# Patient Record
Sex: FEMALE | Race: BLACK OR AFRICAN AMERICAN | Employment: UNEMPLOYED | URBAN - METROPOLITAN AREA
[De-identification: names, ages, dates, MRNs, and addresses within clinical notes are randomized per-mention and may not be internally consistent; named-entity substitution may affect disease eponyms.]

---

## 2020-01-06 ENCOUNTER — OFFICE VISIT (OUTPATIENT)
Dept: SURGERY | Age: 55
End: 2020-01-06

## 2020-01-07 VITALS — HEIGHT: 64 IN | BODY MASS INDEX: 50.02 KG/M2 | WEIGHT: 293 LBS

## 2020-01-07 NOTE — PROGRESS NOTES
Ignacio Monsivais participated in an educational session on the importance of starting to make healthy choices prior to weight loss surgery. General healthy foods were reviewed. Diet history was reviewed. Patient set a dietary, exercise, and behavioral goal in order to start making healthy changes now.      Visit Vitals  Ht 5' 4\" (1.626 m)   Wt 153.5 kg (338 lb 8 oz)   BMI 58.10 kg/m²        01/07/20    Susie Schneider

## 2020-02-03 ENCOUNTER — OFFICE VISIT (OUTPATIENT)
Dept: SURGERY | Age: 55
End: 2020-02-03

## 2020-02-03 VITALS
RESPIRATION RATE: 16 BRPM | BODY MASS INDEX: 50.02 KG/M2 | HEART RATE: 60 BPM | DIASTOLIC BLOOD PRESSURE: 68 MMHG | SYSTOLIC BLOOD PRESSURE: 148 MMHG | WEIGHT: 293 LBS | TEMPERATURE: 97.8 F | OXYGEN SATURATION: 100 % | HEIGHT: 64 IN

## 2020-02-03 DIAGNOSIS — E66.01 MORBID OBESITY (HCC): ICD-10-CM

## 2020-02-03 DIAGNOSIS — E66.01 MORBID OBESITY WITH BODY MASS INDEX (BMI) OF 50.0 TO 59.9 IN ADULT (HCC): Primary | ICD-10-CM

## 2020-02-03 DIAGNOSIS — M25.50 ARTHRALGIA, UNSPECIFIED JOINT: ICD-10-CM

## 2020-02-03 DIAGNOSIS — F17.200 SMOKER: ICD-10-CM

## 2020-02-03 DIAGNOSIS — F19.11 HISTORY OF DRUG ABUSE (HCC): ICD-10-CM

## 2020-02-03 DIAGNOSIS — N92.6 IRREGULAR MENSES: ICD-10-CM

## 2020-02-03 DIAGNOSIS — K30 FUNCTIONAL DYSPEPSIA: ICD-10-CM

## 2020-02-03 DIAGNOSIS — I10 HYPERTENSION, UNSPECIFIED TYPE: ICD-10-CM

## 2020-02-03 RX ORDER — PENICILLIN V POTASSIUM 500 MG/1
500 TABLET, FILM COATED ORAL
COMMUNITY
Start: 2020-02-01 | End: 2020-02-11

## 2020-02-03 NOTE — PROGRESS NOTES
Bariatric Surgery Consultation    Subjective: The patient is a 49 y.o. obese female with a Body mass index is 55.66 kg/m². .  The patient is at her heaviest weight for the past 5 years. she has been overweight since age 37.     she has been considering surgery since last 2 years. she desires surgery at this time because of multiple health concerns and their lifestyle issues which are hindered by their weight. she has been referred by his family physician Dr Rafi Albarado for evaluation and treatment of their obesity via surgical intervention. Cabrera Bell has tried multiple diets in her lifetime   most recently tried physician supervised, behavior modification, Weight Watchers and Atkins    Bariatric comorbidities present are   Patient Active Problem List   Diagnosis Code    Morbid obesity ([facility] E66.01    Morbid obesity with body mass index (BMI) of 50.0 to 59.9 in adult ([facility] [facility] E66.01, Z68.43    Smoker F17.200    History of drug abuse ([facility] F19.11    Hypertension I10    Arthritic-like pain M25.50    Irregular menses N92.6    Functional dyspepsia K30       The patient is considering laparoscopic gastric bypass surgery for surgical weight loss due to their ineffective progress with medical forms of weight loss and the urging of their physicians   who care for their primary medical issues. The patient  now presents  for consideration for weight loss surgery understanding the benefits of this over a medical approach of weight loss as   was discussed in our presentation on weight loss surgery. They have discussed their plans both with their family and primary care physician who is in support of their pursuit of such. The patient   has not had health issues as of late and denies and gastrointestinal disturbances other than what is outlined below in their review of symptoms.  All of their prior evaluations available by both their   PCP's and specialists physicians have been reviewed today either in the Care Everywhere portal or scanned under the media tab. I have spent a large portion of my initial consultation today reviewing the patients current dietary habits which have contributed to their health issues and obesity. I have suggested to them personally a dietary regimen that they can initiate now to help with their status as it pertains to their weight. They understand that the most important aspect of their   journey through their weight loss endeavor will be their adherence to a new lifestyle of healthy eating behavior. They also understand that an adherence to an exercise program will not only   help with weight loss but is ultimately important in weight maintenance. The patients goal weight is 180lb. These goals are consistent with expected outcomes of their desired operation. her Medical goals are resolution of these health issues. Patient Active Problem List    Diagnosis Date Noted    Morbid obesity ([facility]     Morbid obesity with body mass index (BMI) of 50.0 to 59.9 in [facility])     Smoker     History of drug abuse ([facility] [address] [facility]     Hypertension     Arthritic-like pain     Irregular menses     Functional dyspepsia       Past Surgical History:   Procedure Laterality Date    HX HERNIA REPAIR      umbilical     HX OTHER SURGICAL      repair of vaginal trauma related to childbirth      Social History     Tobacco Use    Smoking status: Former Smoker     Last attempt to quit: 2/3/2020    Smokeless tobacco: Never Used   Substance Use Topics    Alcohol use: Not Currently      Family History   Problem Relation Age of Onset    Heart Disease Mother     Diabetes Mother     Liver Disease Father       Current Outpatient Medications   Medication Sig Dispense Refill    penicillin v potassium (VEETID) 500 mg tablet Take 500 mg by mouth.        No Known Allergies       Review of Systems:            General - No history or complaints of unexpected fever, chills, or weight loss  Head/Neck - No history or complaints of headache, diplopia, dysphagia, hearing loss  Cardiac - No history or complaints of chest pain, palpitations, murmur, or shortness of breath  Pulmonary - No history or complaints of shortness of breath, productive cough, hemoptysis  Gastrointestinal - mild reflux noted ,no  abdominal pain, obstipation/constipation or blood per rectum  Genitourinary - No history or complaints of hematuria/dysuria, stress urinary incontinence symptoms, or renal lithiasis  Musculoskeletal - + joint pain in their knees,  no muscular weakness  Hematologic - No history or complaints of bleeding disorders,  No blood transfusions  Neurologic - No history or complaints of  migraine headaches, seizure activity, syncopal episodes, TIA or stroke  Integumentary - No history or complaints of rashes, abnormal nevi, skin cancer  Gynecological - sporadic spotting           Objective:     Visit Vitals  /68 (BP 1 Location: Left arm, BP Patient Position: Sitting)   Pulse 60   Temp 97.8 °F (36.6 °C)   Resp 16   Ht 5' 4\" (1.626 m)   Wt 147.1 kg (324 lb 4 oz)   SpO2 100%   BMI 55.66 kg/m²       Physical Examination: General appearance - alert, well appearing, and in no distress and oriented to person, place, and time  Mental status - alert, oriented to person, place, and time, normal mood, behavior, speech, dress, motor activity, and thought processes  Eyes - pupils equal and reactive, extraocular eye movements intact, sclera anicteric, left eye normal, right eye normal  Ears - bilateral TM's and external ear canals normal, right ear normal, left ear normal  Nose - normal and patent, no erythema, discharge or polyps  Mouth - mucous membranes moist, pharynx normal without lesions  Neck - supple, no significant adenopathy  Lymphatics - no palpable lymphadenopathy, no hepatosplenomegaly  Chest - clear to auscultation, no wheezes, rales or rhonchi, symmetric air entry  Heart - normal rate, regular rhythm, normal S1, S2, no murmurs, rubs, clicks or gallops  Abdomen - soft, nontender, nondistended, no masses or organomegaly  Back exam - full range of motion, no tenderness, palpable spasm or pain on motion  Neurological - alert, oriented, normal speech, no focal findings or movement disorder noted  Musculoskeletal - no joint tenderness, deformity or swelling  Extremities - peripheral pulses normal, no pedal edema, no clubbing or cyanosis  Skin - normal coloration and turgor, no rashes, no suspicious skin lesions noted    Labs:     No results found for: WBC, WBCLT, HGBPOC, HGB, HGBP, HCTPOC, HCT, PHCT, RBCH, PLT, MCV, HGBEXT, HCTEXT, PLTEXT  No results found for: NA, K, CL, CO2, AGAP, GLU, BUN, CREA, BUCR, GFRAA, GFRNA, CA, TBIL, TBILI, GPT, SGOT, AP, TP, ALB, GLOB, AGRAT, ALT  No results found for: IRON, FE, TIBC, IBCT, PSAT, FERR  No results found for: FOL, RBCF  No results found for: VITD3, XQVID2, XQVID3, XQVID, VD3RIA              Assessment:     Morbid obesity with associated comorbidity    Plan:     laparoscopic gastric bypass surgery    This is a 49 y.o. female with a BMI of Body mass index is 55.66 kg/m². and the weight-related co-morbidties . Adia Morillo meets the NIH criteria for bariatric surgery based upon the BMI of Body mass index is 55.66 kg/m². and   multiple weight-related co-morbidties. Adia Morillo has elected laparoscopic gastric bypass as her intervention of choice for treatment of morbid obestiy through surgical means secondary   to its uniform results,  profound baseline suppression of hunger and pace at which weight is lost.    In the office today, following Socorro's history and physical examination, a 30 minute discussion regarding the anatomic alterations for the laparoscopic gastric bypass  was undertaken. The dietary expectations and the patient  dependent factors for success were thoroughly discussed, to include the need for interval follow-up and long-term dietary changes associated with success.    The possible short and long term complications of the gastric bypass were also discussed, to include but not limited to;death, DVT/PE, staple line leak, bleeding, stricture formation, infection,internal   hernia  and pouch dilation. Specific weight related outcomes for success were also discussed with an emphasis on careful and close follow-up with the first year and dietary behavior modification over   the first years as baseline cyclical hunger returns  The patient expressed an understanding of the above factors, and her questions were answered in their entirety. In addition, the patient attended a 1.5 hour power point seminar, or an online seminar regarding obesity, surgical weight loss including, adjustable gastric band, gastric bypass, and sleeve gastrectomy. This discussion   contrasted the different surgical techniques, mechanisms of actions and expected outcomes, and surgical and medical risks associated with each procedure. During the  in office seminar, there was a long   question and answer session where each questions was answered until there were no additional questions. Today, the patient had all of her questions answered and desires to proceed with  bariatric surgery initially choosing the gastric bypass as her surgical option. Secondary Diagnoses:     Dietary Intervention  - The patient is currently scheduled to see or has been followed by a bariatric nutritionist for an attempt at preoperative weight loss as has been dictated by their insurance carrier. They will be assessed at various times during their follow up to evaluate their progress depending on the length of time that is required once again by their carrier. I have explained the importance of   preoperative weight loss and the benefits regarding lower surgical risk and also assisting the patient in reaching their weight loss goal. They understand also that they should participate in an  exercise program to assist in this weight loss.  Finally they understand there is a physiologic benefit from the standpoint of hepatic volume reduction and reduction of central visceral adiposity   preoperatively. I have reiterated the importance of a low carbohydrate and high protein regimen to achieve their   stated goal. I have reviewed their current eating behavior prior to this encounter and explained to them in an exhaustive fashion the appropriate diet that they should adhere to. They have been   encouraged to loose weight pre operatively and understand it is our prerogative to cancel surgery or postpone their procedure in the event of significant weight gain. The patients weight loss goal pre operatively is 5-10 pounds. Hypertension - The patient has a clear understanding of how weight loss improves hypertension as a whole, but also they understand that there is a significant genetic component   to this disease process. We will monitor the patients blood pressure while in the hospital and the plan would be to continue those medications postoperatively. If a diuretic is being   used we will stop them on discharge to prevent dehydration particularly with the sleeve gastrectomy and the gastric bypass procedures. They will be instructed to monitor their blood   pressure postoperatively while at home and notify their primary care physician in the event of any significantly high or uncharacteristic readings. They understand that surgery may be  cancelled if their blood pressure is deemed out of control the day of surgery either by myself or the anesthesia department. For this reason they must take their medications as directed   and monitor their blood pressure regularly working up until their surgical date.     Weight Related Arthritis -The patient understands the benefits that weight loss surgery can have on their arthritis but also understands that weight loss is not a guaranteed cure and relief of symptoms is often dependent on the severity of the underlying disease.  The patient also understands that traditional pharmaceutical treatments for this diagnosis are usually unavailable to post-operative weight loss patients due to the effects on the gastrointestinal tract particularly with the gastric bypass and to a lesser effect with the sleeve gastrectomy.  Any changes to the patients medication treatment will ultimately be made the patients PCP with input by our office. Possible or Existing Coronary Artery Disease - The patient has undergone or will undergo a preoperative evaluation by their cardiologist, or a cardiologist of their choice such that they are deemed   a reasonable candidate for surgery. The patient understands that with a history of cardiac disease that there is always an increased risk compared   to the average patient. Appropriate recommendations have been followed as recommended by the cardiologist.  The patients ASA will be resumed   approximately 1 month postoperatively in a coated form if appropriate. Will send to [facility]. for cardiac evaluation.     Signed By: Elsie Gordon MD     February 3, 2020

## 2020-02-12 ENCOUNTER — HOSPITAL ENCOUNTER (OUTPATIENT)
Dept: PREADMISSION TESTING | Age: 55
Discharge: HOME | End: 2020-02-12
Payer: COMMERCIAL

## 2020-02-12 ENCOUNTER — CLINICAL SUPPORT (OUTPATIENT)
Dept: SURGERY | Age: 55
End: 2020-02-12

## 2020-02-12 VITALS — HEIGHT: 64 IN | BODY MASS INDEX: 50.02 KG/M2 | WEIGHT: 293 LBS

## 2020-02-12 DIAGNOSIS — E66.01 MORBID OBESITY WITH BODY MASS INDEX (BMI) OF 50.0 TO 59.9 IN ADULT (HCC): Primary | ICD-10-CM

## 2020-02-12 DIAGNOSIS — E66.01 MORBID OBESITY WITH BODY MASS INDEX (BMI) OF 50.0 TO 59.9 IN ADULT (HCC): ICD-10-CM

## 2020-02-12 LAB — TSH SERPL DL<=0.05 MIU/L-ACNC: 0.87 UIU/ML (ref 0.36–3.74)

## 2020-02-12 PROCEDURE — 86677 HELICOBACTER PYLORI ANTIBODY: CPT

## 2020-02-12 PROCEDURE — 36415 COLL VENOUS BLD VENIPUNCTURE: CPT

## 2020-02-12 PROCEDURE — 84443 ASSAY THYROID STIM HORMONE: CPT

## 2020-02-12 NOTE — PROGRESS NOTES
Medical Weight Loss Multi-Disciplinary Program    Name: Kate Harding   : 1965    Session# 2  Date: 2020     Height: 5' 4\" (162.6 cm)    Weight: 148.8 kg (328 lb) lbs. Body mass index is 56.3 kg/m². Dietitian: Tonia Cohen is a 49 y.o. female who present for a pre-op evaluation. Visit Vitals  Ht 5' 4\" (1.626 m)   Wt 148.8 kg (328 lb)   BMI 56.30 kg/m²     Past Medical History:   Diagnosis Date    Arthritic-like pain     Functional dyspepsia     History of drug abuse ([facility] [address]     sober since     Hypertension     uses prescription medication    Irregular menses     Morbid obesity ([facility] [address]     Morbid obesity with body mass index (BMI) of 50.0 to 59.9 in adult [facility])     Smoker     quitting early  with nicotine patch           Procedure:  laparoscopic gastric bypass surgery     Reasons for Surgery:  BMI > 40 with one or more medically significant comorbidities and HTN    Summary:  Pt given brief pre/post-op diet ed and diet hx reviewed. Pt instructed to follow a low calorie, low carbohydrate, high protein diet of about 0676-9675 calories daily. Pt set several goals. See below. What have you done in the past to try to lose weight? Calorie controlled diet, exercise programs    Why didn't you lose weight or keep the weight off?: Patient notes that she has tried to manage her weight with traditional diet and exercise in the past, she notes without support or guidance she has had limited success with previous weight loss attempts     Why do you think having weight loss surgery will make it possible for you to lose weight and keep it off? Patient hopes surgery will help her control her weight and improve total health     Dietary Instructions    Nutritional Hx: What is the number of meals you eat per day? 2  Comment: SKIPS breakfast and lunch     Do you eat between meals / snack? yes  Typical snack: chips, candy bar, fruit snacks    How fast do you eat your meals?  slow    How often do you eat fast food? occasionally    How many sodas/sugared beverages do you drink per day? Pepsi and juice all day     How many caffeinated drinks do you have per day? Soda and coffee    How much milk and/or juice do you drink per day? Juice daily    How much water do you drink per day? 1 (8oz glasses)    How often do you consume alcohol? never;       Reviewed intake  Understanding label reading  Understanding low carbohydrates, low sugar, higher protein meals  Understanding proper portions  Dining outside home  Instruction given for personal dietary changes  Discussed perceived compliance  Comments: Pt given brief pre/post-op diet ed and diet hx reviewed. Patient Education and Materials Provided:  Supplement [facility], B Vitamin Information, MVI Recommendations, Calcium Citrate Information, Bariatric Supplement Companies, Protein Supplement Information, Fluid Requirements, No Caffeine or Carbonation, No Alcohol for One Year Post Op, 3 Balanced Meals a Day, Food Group Guide, Good Choices Dining Out, No Snacks, No Concentrated Sweets, Support System at Home, Exercising, Support Group Information and Addressed Current Habits / Changes to make  Physical Activity/Exercise    Discussed Perceived Compliance  Reasonable Goals Set  Motivation  Comments: none    Exercise:  Do you currently have an exercise routine? no    Goals:   1. Work to increase to 3-4 small meals per day, with planned snacks as needed. Recommend following plate method for meal planning - focusing on lean protein, non-starchy vegetables, and measured amounts of starch. - Goal of  g protein and 100 g carbohydrate per day. - Recommend continuing protein supplement as meal replacement at least 1x/day  2. Increase non caloric fluid to 64 oz per day.   Eliminate caffeine, added sugar, carbonation, and straws.               -Continue to work to decrease sugar sweetened beverages - goal of calorie free beverages only -Must eliminate caffeine prior to surgery and avoid for ~6-8 weeks  3. Start activity regimen, work to increase ADL              -Try to start walking for at least 30-60  minutes 4-5 days per week  4. Start Complete MVI    Candidate for surgery (per RD): PENDING   Mikayla Tafoya, GANGA  02/12/20

## 2020-02-17 LAB
H PYLORI IGA SER-ACNC: <9 UNITS (ref 0–8.9)
H PYLORI IGG SER IA-ACNC: 0.36 INDEX VALUE (ref 0–0.79)
H PYLORI IGM SER-ACNC: <9 UNITS (ref 0–8.9)

## 2020-03-19 ENCOUNTER — OFFICE VISIT (OUTPATIENT)
Dept: SURGERY | Age: 55
End: 2020-03-19

## 2020-03-19 VITALS — WEIGHT: 293 LBS | HEIGHT: 64 IN | BODY MASS INDEX: 50.02 KG/M2

## 2020-03-19 DIAGNOSIS — E66.01 MORBID OBESITY WITH BODY MASS INDEX (BMI) OF 50.0 TO 59.9 IN ADULT (HCC): Primary | ICD-10-CM

## 2020-03-19 NOTE — PROGRESS NOTES
Medical Weight Loss Multi-Disciplinary Program    Name: Joe Castellon   : 1965    Session# 3  Date: 3/19/2020    Visit Vitals  Ht 5' 4\" (1.626 m)   Wt 148.8 kg (328 lb)   BMI 56.30 kg/m²       Patient is still smoking   Dietary Instructions    Reviewed intake  Understanding low carbohydrates, low sugar, higher protein meals  Instruction given for personal dietary changes  Discussed perceived compliance  Comments: Diet hx reviewed and personal dietary changes discussed. Reviewed recommendation to follow [id number] calorie diet, working to reduce total carbohydrate intake to  g or less per day and increasing protein intake to  g per day, compared current intake to recommendations. Typical intake is as follows:  Breakfast: 1 boiled with 3-4 slices of wilks   Snack: 1 snack size bag of chips OR apple OR pear   Lunch: Grilled cheese and vegetable soup   Dinner: Roast chicken with gravy, potatoes and green beans  Fluids: Water - 6-7 bottles per day     Nutritional Hx: What is the number of meals you eat per day? 2-3 - typically     Do you eat between meals / snack? yes  Typical snack:  1 snack size bag of chips OR apple OR pear     Have you sampled protein supplements? no and reports unable to afford protein supplements     How often do you eat fast food, take-out restaurant food? none    How fast do you eat your meals? slow    How many sodas/sugared beverages do you drink per day? none    How many caffeinated drinks do you have per day? Coffee, but not daily     How much water do you drink per day? 8-10 (8oz glasses)    How often do you consume alcohol? Never     Are you taking complete multivitamin:  None     Physical Activity/Exercise    Discussed Perceived Compliance  Motivation    Patient has  started physical activity regimen, reinforced the importance of regular physical activity for total health and weight management.   Tracking activity with exercise log, created circuit at your home - using neighborhood loop and steps. Behavior Modification    Identify obstacles to trigger change  Achieving/Rewarding goals met  Positive attitude  Comments: Reinforced importance continuing to modify lifestyle patterns and behaviors to promote weight loss and long term weight maintenance       Goals:   1. Work to increase to 3-4 small meals per day, with planned snacks as needed. Recommend following plate method for meal planning - focusing on lean protein, non-starchy vegetables, and measured amounts of starch. - Goal of  g protein and  g carbohydrate per day. - Recommend continuing protein supplement as meal replacement at least 1x/day OR as high protein snack option  2. Increase non caloric fluid to 64 oz per day. Eliminate caffeine, added sugar, carbonation, and straws.               -Continue to work to decrease sugar sweetened beverages - goal of calorie free beverages only              -Must eliminate caffeine prior to surgery and avoid for ~6-8 weeks   -Practice 30:30 rule,  food and flood   3. Start activity regimen, work to increase ADL  4. Start Complete MVI    Candidate for surgery (per RD):  PENDING    Dietitian: Chelsea Elkins RD

## 2020-04-16 ENCOUNTER — OFFICE VISIT (OUTPATIENT)
Dept: SURGERY | Age: 55
End: 2020-04-16

## 2020-04-16 DIAGNOSIS — E66.01 MORBID OBESITY WITH BODY MASS INDEX (BMI) OF 50.0 TO 59.9 IN ADULT (HCC): Primary | ICD-10-CM

## 2020-04-16 NOTE — PROGRESS NOTES
Medical Weight Loss Multi-Disciplinary Program    Name: Savannah Perez   : 1965    Session# 4  Date: 2020    Visit Vitals  Ht 5' 4\" (1.626 m)   Wt 148.8 kg (328 lb)   BMI 56.30 kg/m²     Patient is still smoking   Dietary Instructions    Reviewed intake  Understanding low carbohydrates, low sugar, higher protein meals  Instruction given for personal dietary changes  Discussed perceived compliance  Comments: Diet hx reviewed and personal dietary changes discussed. Reviewed recommendation to follow [id number] calorie diet, working to reduce total carbohydrate intake to  g or less per day and increasing protein intake to  g per day, compared current intake to recommendations. Patient reports her main road block is her food budget which limits her options for preparing foor choices. Typical intake is as follows:  Breakfast: 1 boiled with 3-4 slices of wilks   Snack: 1 snack size bag of chips OR apple OR pear OR 1 boost   Lunch: Grilled cheese and vegetable soup OR 1 slice of bread with tuna salad   Dinner: Roast chicken with gravy, potatoes and green beans OR Baked fish with salad greens   Fluids: Water - 6-7 bottles per day     Nutritional Hx: What is the number of meals you eat per day? 2-3 - typically     Do you eat between meals / snack? yes  Typical snack:  1 snack size bag of chips OR apple OR pear     Have you sampled protein supplements? no and reports unable to afford protein supplements - did try     How often do you eat fast food, take-out restaurant food? none    How fast do you eat your meals? slow    How many sodas/sugared beverages do you drink per day? none    How many caffeinated drinks do you have per day? Coffee, but not daily     How much water do you drink per day? 8-10 (8oz glasses)    How often do you consume alcohol?  Never     Are you taking complete multivitamin:  None     Physical Activity/Exercise    Discussed Perceived Compliance  Motivation    Patient has  started physical activity regimen, reinforced the importance of regular physical activity for total health and weight management. Tracking activity with exercise log, created circuit at your home - using neighborhood loop and steps. Behavior Modification    Identify obstacles to trigger change  Achieving/Rewarding goals met  Positive attitude  Comments: Reinforced importance continuing to modify lifestyle patterns and behaviors to promote weight loss and long term weight maintenance       Goals:   1. Work to increase to 3-4 small meals per day, with planned snacks as needed. Recommend following plate method for meal planning - focusing on lean protein, non-starchy vegetables, and measured amounts of starch. - Goal of  g protein and  g carbohydrate per day. - Recommend continuing protein supplement as meal replacement at least 1x/day OR as high protein snack option  2. Increase non caloric fluid to 64 oz per day. Eliminate caffeine, added sugar, carbonation, and straws.               -Continue to work to decrease sugar sweetened beverages - goal of calorie free beverages only              -Must eliminate caffeine prior to surgery and avoid for ~6-8 weeks   -Practice 30:30 rule,  food and flood   3. Start activity regimen, work to increase ADL  4. Start Complete MVI    Candidate for surgery (per RD):  PENDING    Dietitian: Marlon Roberts RD

## 2020-04-27 ENCOUNTER — VIRTUAL VISIT (OUTPATIENT)
Dept: SURGERY | Age: 55
End: 2020-04-27

## 2020-04-27 VITALS — WEIGHT: 293 LBS | HEIGHT: 64 IN | BODY MASS INDEX: 50.02 KG/M2

## 2020-04-27 DIAGNOSIS — E66.01 MORBID OBESITY (HCC): ICD-10-CM

## 2020-04-27 DIAGNOSIS — Z87.891 FORMER SMOKER: ICD-10-CM

## 2020-04-27 DIAGNOSIS — E66.01 MORBID OBESITY WITH BODY MASS INDEX (BMI) OF 50.0 TO 59.9 IN ADULT (HCC): ICD-10-CM

## 2020-04-27 DIAGNOSIS — I10 ESSENTIAL HYPERTENSION: Primary | ICD-10-CM

## 2020-04-27 DIAGNOSIS — M25.50 ARTHRALGIA, UNSPECIFIED JOINT: ICD-10-CM

## 2020-04-27 DIAGNOSIS — K30 FUNCTIONAL DYSPEPSIA: ICD-10-CM

## 2020-04-27 DIAGNOSIS — N92.6 IRREGULAR MENSES: ICD-10-CM

## 2020-04-27 NOTE — PROGRESS NOTES
Bariatric Surgery Consultation    Subjective: Willian Bentley is a 49 y.o. obese female with a Body mass index is 54.24 kg/m². [employer] she desires surgery at this time because of health issues and quality of life issues. Willian Bentley has been seen by a bariatric nutritionist and has been placed on an appropriate low carbohydrate diet. The patient desires laparoscopic gastric bypass surgery for surgical weight loss, however she is here today to review their workup to date. Willian Bentley is here also today to check progress with weight loss / evaluate nutritional status and review all subspecialty clearances in hopes of proceeding to the operating room.      She has lost a total of 16 pounds to date during this pre op program.    Patient Active Problem List    Diagnosis Date Noted    Former smoker     Morbid obesity ([facility] [address]     Morbid obesity with body mass index (BMI) of 50.0 to 59.9 in adult [facility])     Smoker     History of drug abuse ([facility] [address]     Hypertension     Arthritic-like pain     Irregular menses     Functional dyspepsia       Past Surgical History:   Procedure Laterality Date    HX HERNIA REPAIR      umbilical     HX OTHER SURGICAL      repair of vaginal trauma related to childbirth      Social History     Tobacco Use    Smoking status: Former Smoker     Last attempt to quit: 2/3/2020     Years since quittin.2    Smokeless tobacco: Never Used   Substance Use Topics    Alcohol use: Not Currently      Family History   Problem Relation Age of Onset    Heart Disease Mother     Diabetes Mother     Liver Disease Father         No Known Allergies       Review of Systems:            General - No history or complaints of unexpected fever, chills, or weight loss  Head/Neck - No history or complaints of headache, diplopia, dysphagia, hearing loss  Cardiac - No history or complaints of chest pain, palpitations, murmur, or shortness of breath  Pulmonary - No history or complaints of shortness of breath, productive cough, hemoptysis  Gastrointestinal - No history or complaints of reflux,  abdominal pain, obstipation/constipation, blood per rectum  Genitourinary - No history or complaints of hematuria/dysuria, stress urinary incontinence symptoms, or renal lithiasis  Musculoskeletal - No history or complaints of joint pain or muscular weakness  Hematologic - No history or complaints of bleeding disorders, blood transfusions, sickle cell anemia  Neurologic - No history or complaints of  migraine headaches, seizure activity, syncopal episodes, TIA or stroke  Integumentary - No history or complaints of rashes, abnormal nevi, skin cancer  Gynecological - n/a           Objective:     Visit Vitals   5' 4\" (1.626 m)   Wt 143.3 kg (316 lb)   BMI 54.24 kg/m²       Physical Examination:       Physical Examination: General appearance - alert, well appearing, and in no distress and oriented to person, place, and time  Mental status - alert, oriented to person, place, and time, normal mood, behavior, speech, dress, motor activity, and thought processes  Eyes - pupils equal and reactive, extraocular eye movements intact, sclera anicteric, left eye normal, right eye normal  Ears - right ear normal, left ear normal  Neck- good extension and flexion, no obvious swelling  Chest - good air movement  Heart - N/A  Abdomen - no obvious distension. Neurological - alert, oriented, normal speech, no focal findings or movement disorder noted  Musculoskeletal - no swelling noted  Extremities - normal movement    Labs:     Recent Results (from the past 2016 hour(s))   H.  PYLORI ABS, IGG, IGA, IGM    Collection Time: 02/12/20 12:18 PM   Result Value Ref Range    H. pylori Ab, IgG 0.36 0.00 - 0.79 Index Value    H. pylori Ab, IgA <9.0 0.0 - 8.9 units    H. pylori Ab, IgM <9.0 0.0 - 8.9 units   TSH 3RD GENERATION    Collection Time: 02/12/20 12:18 PM   Result Value Ref Range    TSH 0.87 0.36 - 3.74 uIU/mL       Cardiac Evaluation:      Nuclear Stress Daron 55  Component Name Value Ref Range   Target  bpm   Max OK      Lung/Heart ratio 0.35     Nuc Stress EF 53 %   Nuc Rest EF 52 %   Other Result Information   This result has an attachment that is not available. Result Narrative   · The EKG stress is non-diagnostic for ischemia due to pharmacological   protocol. No cardiac symptoms  · Hypertensive at baseline. Expected hemodynamic response to Lexiscan   otherwise. · There is a large size basal to mid anterior, anterolateral, apical   septal, apical anterior perfusion defect,  · Post-stress LVEF is 52%. On right images breast attenuation is noted and   shifting breast attenuation can be 1 of the differentials for this defect. Clinical correlation advised. Other Result Information   Phuong Davidson MD - 03/20/2020 11:38 AM EDT  · The EKG stress is non-diagnostic for ischemia due to pharmacological protocol. No cardiac symptoms  · Hypertensive at baseline. Expected hemodynamic response to Lexiscan otherwise. · There is a large size basal to mid anterior, anterolateral, apical septal, apical anterior perfusion defect,  · Post-stress LVEF is 52%. On right images breast attenuation is noted and shifting breast attenuation can be 1 of the differentials for this defect. Clinical correlation advised.              Echo: [address]  Component Name Value Ref Range   AV regurgitation pressure 1/2 time 0.89 s   AV regurgitation pressure 1/2 time 0.89 s   PISA AR VN Nyquist 467.00 cm/s   Ao peak paul 2.15 m/s   AV peak gradient 18.49 mmHg   AV mean gradient 10.00 mmHg   Ao VTI 47.30 cm   Aortic valve mean velocity 1.46 m/s   AV area by cont VTI 2.9 cm2   AV area peak paul 2.8 cm2   Ao d 2.70 cm   IVS 1.00 0.6 - 1.1 cm   LVIDD 5.50 cm   LVIDS 3.90 cm   LVOT d 2.00 cm   LVOT mn grad 6.0 mmHg   LVOT VTI 43.40 cm   LVOT Mean Paul 1.11 m/s   LVOT peak paul 1.91 m/s   PW 1.00 0.6 - 1.1 cm   Left Ventricular EF by 2-D Biplane by Method of Disks 55.20 Percent   FS 29 %   IVS/PW 2D 1.00     LA size 3.60 cm   Left Atrium 3.60 cm   Left Atrium to Aortic Root Ratio 1.33     E Decel t 0.35 s   MV A Paul 96.50 cm/s   MV E Paul 87.30 cm/s   E/A ratio 0.90     RVID d 2.5 cm   TR VMAX 290.50 cm/s   Triscuspid Valve Regurgitation Peak Gradient 34.00 mmHg   Tapse 2.80 cm   LVEF (Teich) 55 %   AV LVOT peak gradient 14.59 mmHg   AV Velocity Ratio 0.89     Echo EF Estimated 55 %   PASP 37.00 mmHg   Other Result Information   This result has an attachment that is not available. Result Narrative   · Normal LV systolic and diastolic function, EF 55 - 60% normal wall   motion  · Mild MR, trace TR  · No vegetations seen no thrombi seen  · No significant pericardial effusion         Assessment:     Morbid obesity with associated comorbidity. Plan:     Continuation of Pre-Operative evaluation / clearance. Katherine Solis has returned to the office today to discuss her status as a surgical candidate.    her progress has been noted and reviewed. We will continue the pre-operative process and work towards goals as outlined. she has  more pounds to lose before proceeding to the OR.  (16 pounds lost since first visit)  she has 2 more nutritional visits to complete before proceeding to the OR  she has a cardiac clearance to review before proceeding to the OR. She had a non diagnostic stress test done by Dr Davidson Nelson. She also needs a psychologist clearance scheduled. Katherine Solis understand the rationales for all the above. It has been discussed that given her   condition   that the best surgical option for this patient would be the laparoscopic gastric bypass surgery. Katherine Solis   agrees with the surgical choice and has been educated in it's; risks, benefits, and alternatives. We will continue   with the pre-operative evaluation as needed to check progress.     Secondary Diagnoses:         Signed By: Levi Garcia MD April 27, 2020           This visit with Megan Weber was performed under virtual telemedicine guidelines during the coronavirus (WVLTE-95) public health emergency on 4/27/2020 in an interactive   fashion using [employer]. me. They understand that this telemedicine encounter is a billable service, with coverage determined by their insurance carrier. They are aware that   they may receive a bill and have provided verbal consent for this virtual visit. This visit was performed with the patient in their home environment and provider was   present at [facility]. I have spent over 30 minutes on this visit  both prior to the visits reviewing the patients chart and with the patient face to face. I have reviewed their medical history, performed a telemedicine physical examination, and discussed the plan of action to date. They understand that they will be asked   to come to the office when our office is allowed normal patient interaction, as dictated by public health officials, for a face-to-face visit to rediscuss all of the things we  have talked about today.        Soraida Collier M.D.  4/27/2020

## 2020-04-27 NOTE — PATIENT INSTRUCTIONS
Patient Instructions 1. Continue to monitor carbohydrate and protein intake- remember to keep your           total  carbohydrates to 50 grams or less per day for best results. 2. Remember hydration goals - usually 48 to 64 ounces of liquids per day 3. Continue to work towards exercise goals - minimum 3 days per week of 45          minutes to  1 hour at a time. 4. Remember to take vitamins as directed Supplement Resource Guide Importance of Protein:  
Maintains lean body mass, produces antibodies to fight off infections, heals wounds, minimizes hair loss, helps to give you energy, helps with satiety, and keeping you full between meals. Importance of Calcium: 
Needed for healthy bones and teeth, normal blood clotting, and nervous system functioning, higher risk of osteoporosis and bone disease with non-compliance. Importance of Multivitamins: Many functions. Supply you with extra nutrients that you may be missing from food. May lead to iron deficiency anemia, weakness, fatigue, and many other symptoms with non-compliance. Importance of B Vitamins: 
Important for red blood cell formation, metabolism, energy, and helps to maintain a healthy nervous system. Protein Supplement Find one you like now. Use immediately after surgery. Look for: 
35-50g protein each day from your protein supplement once you reach the progression diet. 0-3 g fat per serving 0-3 g sugar per serving Protein drinks should be split in separate dosages. Recommend: Lifelong 1 year + Calcium Supplement:  
 
Start taking within a month after surgery. Look for: Calcium Citrate Plus D (1500 mg per day) Recommend: Citracal 
 
 . Avoid chocolate chewable calcium. Can use chewable bariatric or [employer] brand or similar chewable. The body cannot absorb more than 500-600 mg @ a time. Take for Life Multi-vitamin Supplement: 1st Month After Surgery: Any complete chewable, such as: [place]s Complete chewables. Avoid [place] sours or gummies. They lack iron and other important nutrients and also have added sugar. Continue with chewable vitamin or change to adult complete multivitamin one month after surgery. Menstruating women can take a prenatal vitamin. Make sure has at least 18 mg iron and 899-012 mcg folic acid): Vitamin B12, B Complex Vitamin, and Biotin Start taking within a month after surgery. Vitamin B12:  1000 mcg of Vitamin B12 three times weekly Must take sublingually (meaning you take it under your tongue) or in a liquid drop form for easy absorption. B Complex Vitamin: Take a pill or liquid drop form once daily. Biotin: This vitamin can help prevent hair loss. Recommend 5mg  
(5000 mcg) a day Biotin is Optional

## 2020-05-27 ENCOUNTER — OFFICE VISIT (OUTPATIENT)
Dept: SURGERY | Age: 55
End: 2020-05-27

## 2020-05-27 VITALS — HEIGHT: 64 IN | BODY MASS INDEX: 50.02 KG/M2 | WEIGHT: 293 LBS

## 2020-05-27 DIAGNOSIS — E66.01 MORBID OBESITY (HCC): Primary | ICD-10-CM

## 2020-05-27 NOTE — PROGRESS NOTES
Medical Weight Loss Multi-Disciplinary Program    Name: Levi Amaro   : 1965    Session# 5  Date: 2020    Visit Vitals  Ht 5' 4\" (1.626 m)   Wt 148.8 kg (328 lb)   BMI 56.30 kg/m²     Patient is still smoking   Dietary Instructions    Reviewed intake  Understanding low carbohydrates, low sugar, higher protein meals  Instruction given for personal dietary changes  Discussed perceived compliance  Comments: Diet hx reviewed and personal dietary changes discussed. Reviewed recommendation to follow [id number] calorie diet, working to reduce total carbohydrate intake to  g or less per day and increasing protein intake to  g per day, compared current intake to recommendations. Patient notes that she has not been following a set intake regimen, she reports her sister passed away and she was eating lots of comfort foods and stress eating. Patient has limited recall of previous goals reviewed. This writer has mailed education packets to patient x 2 and she has not yet reviewed or applied the information. She has made progress with general wellness and portion control, but feel that patient has limited understanding of high protein, low carbohydrate diet principles. Typical intake is as follows:  Breakfast: 1 boiled with 1 slice of wilks   Snack: 1 snack size bag of chips OR apple OR pear OR 1 protein shake   Lunch: Grilled cheese and vegetable soup OR 1 slice of bread with tuna salad   Dinner: Roast chicken with gravy, potatoes and green beans OR Baked fish with salad greens   Fluids: Water - 6-7 bottles per day     Nutritional Hx: What is the number of meals you eat per day? 2-3 - typically     Do you eat between meals / snack?  yes  Typical snack:  1 snack size bag of chips OR apple OR pear     Have you sampled protein supplements? no and reports unable to afford protein supplements - did try a few that her friend provided     How often do you eat fast food, take-out restaurant food? none    How fast do you eat your meals? slow    How many sodas/sugared beverages do you drink per day? none    How many caffeinated drinks do you have per day? Coffee, but not daily     How much water do you drink per day? 8-10 (8oz glasses)    How often do you consume alcohol? Never     Are you taking complete multivitamin:  None     Physical Activity/Exercise    Discussed Perceived Compliance  Motivation    Patient has  started physical activity regimen, reinforced the importance of regular physical activity for total health and weight management. Tracking activity with exercise log, created circuit at your home - using neighborhood loop and steps. Behavior Modification    Identify obstacles to trigger change  Achieving/Rewarding goals met  Positive attitude  Comments: Reinforced importance continuing to modify lifestyle patterns and behaviors to promote weight loss and long term weight maintenance       Goals:   1. Work to increase to 3-4 small meals per day, with planned snacks as needed. Recommend following plate method for meal planning - focusing on lean protein, non-starchy vegetables, and measured amounts of starch. - Goal of  g protein and  g carbohydrate per day. - Recommend continuing protein supplement as meal replacement at least 1x/day OR as high protein snack option  2. Increase non caloric fluid to 64 oz per day. Eliminate caffeine, added sugar, carbonation, and straws.               -Continue to work to decrease sugar sweetened beverages - goal of calorie free beverages only              -Must eliminate caffeine prior to surgery and avoid for ~6-8 weeks   -Practice 30:30 rule,  food and flood   3. Start activity regimen, work to increase ADL  4. Start Complete MVI    Candidate for surgery (per RD):  PENDING    Dietitian: Kem Sandifer, RD

## 2020-06-24 ENCOUNTER — CLINICAL SUPPORT (OUTPATIENT)
Dept: SURGERY | Age: 55
End: 2020-06-24

## 2020-06-24 VITALS — HEIGHT: 64 IN | BODY MASS INDEX: 50.02 KG/M2 | WEIGHT: 293 LBS

## 2020-06-24 DIAGNOSIS — E66.01 MORBID OBESITY WITH BODY MASS INDEX (BMI) OF 50.0 TO 59.9 IN ADULT (HCC): Primary | ICD-10-CM

## 2020-07-01 ENCOUNTER — TELEPHONE (OUTPATIENT)
Dept: SURGERY | Age: 55
End: 2020-07-01

## 2020-07-01 NOTE — TELEPHONE ENCOUNTER
Patient cancelled her in person nutrition follow-up, is scheduled to follow up with surgeon for virtual visit 07/15/2020. At this time feel that patient could benefit from additional education prior to being considered a fit surgical candidate.     Devonte Bonner RD

## 2020-07-15 ENCOUNTER — OFFICE VISIT (OUTPATIENT)
Dept: SURGERY | Age: 55
End: 2020-07-15

## 2020-07-15 ENCOUNTER — HOSPITAL ENCOUNTER (OUTPATIENT)
Age: 55
Setting detail: OUTPATIENT SURGERY
Discharge: HOME | End: 2020-07-15
Attending: SPECIALIST | Admitting: SPECIALIST
Payer: MEDICAID

## 2020-07-15 ENCOUNTER — APPOINTMENT (OUTPATIENT)
Dept: GENERAL RADIOLOGY | Age: 55
End: 2020-07-15
Attending: SPECIALIST
Payer: MEDICAID

## 2020-07-15 VITALS
OXYGEN SATURATION: 98 % | WEIGHT: 293 LBS | SYSTOLIC BLOOD PRESSURE: 133 MMHG | RESPIRATION RATE: 16 BRPM | HEIGHT: 64 IN | HEART RATE: 61 BPM | DIASTOLIC BLOOD PRESSURE: 67 MMHG | TEMPERATURE: 96.6 F | BODY MASS INDEX: 50.02 KG/M2

## 2020-07-15 VITALS
TEMPERATURE: 96.8 F | DIASTOLIC BLOOD PRESSURE: 81 MMHG | SYSTOLIC BLOOD PRESSURE: 167 MMHG | WEIGHT: 293 LBS | BODY MASS INDEX: 50.02 KG/M2 | HEART RATE: 96 BPM | HEIGHT: 64 IN | OXYGEN SATURATION: 96 %

## 2020-07-15 DIAGNOSIS — E66.01 MORBID OBESITY (HCC): ICD-10-CM

## 2020-07-15 DIAGNOSIS — E66.01 MORBID OBESITY WITH BODY MASS INDEX (BMI) OF 50.0 TO 59.9 IN ADULT (HCC): ICD-10-CM

## 2020-07-15 DIAGNOSIS — Z01.818 PREOPERATIVE EXAMINATION: Primary | ICD-10-CM

## 2020-07-15 DIAGNOSIS — E66.01 MORBID OBESITY (HCC): Primary | ICD-10-CM

## 2020-07-15 PROCEDURE — 74011000255 HC RX REV CODE- 255: Performed by: SPECIALIST

## 2020-07-15 PROCEDURE — 74240 X-RAY XM UPR GI TRC 1CNTRST: CPT

## 2020-07-15 PROCEDURE — 76040000019: Performed by: SPECIALIST

## 2020-07-15 PROCEDURE — 77030040361 HC SLV COMPR DVT MDII -B: Performed by: SPECIALIST

## 2020-07-15 NOTE — PROCEDURES
Patient: Natalia Johnson   : 1965  Medical Record [id number]            PREPROCEDURE DIAGNOSIS: This patient is preoperative for laparoscopic gastric bypass surgeryprocedure with a history of  reflux disease. POSTPROCEDURE DIAGNOSIS: This patient is preoperative for laparoscopic gastric bypass surgeryprocedure with a history of  reflux disease. PROCEDURES PERFORMED: Upper GI study with barium. ESTIMATED BLOOD LOSS: None. SPECIMENS: None. STATEMENT OF MEDICAL NECESSITY: The patient is a patient with a  longstanding history of obesity. They are now considering the laparoscopic gastric bypass surgeryprocedure as a means of surgical weight control and due to their history of reflux disease and are being assessed preoperatively for such. DESCRIPTION OF PROCEDURE: The patient was brought to the fluoroscopy unit and  was given thin barium. On swallowing of barium, they were noted to have  normal peristalsis of their esophagus. They had prompt filling of distal  esophagus with tapering into the gastroesophageal junction. There was no evidence of a hiatal hernia present. Contrast then filled the gastric cardia, fundus,body and pre pyloric region with no abnormalities noted. Contrast then exited the pylorus in normal fashion. No obstruction was noted. There was no evidence of reflux noted.     (normal anatomy)    Sarah Garcia MD

## 2020-07-15 NOTE — PATIENT INSTRUCTIONS
New patient Instructions      1. Ensure all pre-operative insurances requirements are complete (ie; dietary visits, psychology consults, primary care documentation, etc)    2. Adhere to pre-operative weight loss / weight maintenance plan discussed in the office today. 3. Contact the office with any questions on pre-operative clearance issues (ie; cardiology work-up, pulmonary work-up, upper GI study, etc). 4. If a barium upper GI study has been ordered for your evaluation, make sure you are on liquids only the morning of the procedure. Walking for Exercise: Care Instructions  Your Care Instructions    Walking is one of the easiest ways to get the exercise you need for good health. A brisk, 30-minute walk each day can help you feel better and have more energy. It can help you lower your risk of disease. Walking can help you keep your bones strong and your heart healthy. Check with your doctor before you start a walking plan if you have heart problems, other health issues, or you have not been active in a long time. Follow your doctor's instructions for safe levels of exercise. Follow-up care is a key part of your treatment and safety. Be sure to make and go to all appointments, and call your doctor if you are having problems. It's also a good idea to know your test results and keep a list of the medicines you take. How can you care for yourself at home? Getting started  · Start slowly and set a short-term goal. For example, walk for 5 or 10 minutes every day. · Bit by bit, increase the amount you walk every day. Try for at least 30 minutes on most days of the week. You also may want to swim, bike, or do other activities. · If finding enough time is a problem, it is fine to be active in blocks of 10 minutes or more throughout your day and week.   · To get the heart-healthy benefits of walking, you need to walk briskly enough to increase your heart rate and breathing, but not so fast that you cannot talk comfortably. · Wear comfortable shoes that fit well and provide good support for your feet and ankles. Staying with your plan  · After you've made walking a habit, set a longer-term goal. You may want to set a goal of walking briskly for longer or walking farther. Experts say to do 2½ hours of moderate activity a week. A faster heartbeat is what defines moderate-level activity. · To stay motivated, walk with friends, coworkers, or pets. · Use a phone carolyn or pedometer to track your steps each day. Set a goal to increase your steps. Once you get there, set a higher goal. Aim for 10,000 steps a day. · If the weather keeps you from walking outside, go for walks at the mall with a friend. Local schools and churches may have indoor gyms where you can walk. Fitting a walk into your workday  · Park several blocks away from work, or get off the bus a few stops early. · Use the stairs instead of the elevator, at least for a few floors. · Suggest holding meetings with colleagues during a walk inside or outside the building. · Use the restroom that is the farthest from your desk or workstation. · Use your morning and afternoon breaks to take quick 15-minute walks. Staying safe  · Know your surroundings. Walk in a well-lighted, safe place. If it is dark, walk with a partner. Wear light-colored clothing. If you can, buy a vest or jacket that reflects light. · Carry a cell phone for emergencies. · Drink plenty of water. Take a water bottle with you when you walk. This is very important if it is hot out. · Be careful not to slip on wet or icy ground. You can buy \"grippers\" for your shoes to help keep you from slipping. · Pay attention to your walking surface. Use sidewalks and paths. · If you have breathing problems like asthma or COPD, ask your doctor when it is safe for you to walk outdoors. Cold, dry air, smog, pollen, or other things in the air could cause breathing problems. Where can you learn more?   Go to http://claudia-rohan.info/. Enter R159 in the search box to learn more about \"Walking for Exercise: Care Instructions. \"  Current as of: August 19, 2018  Content Version: 12.1  © 5359-7621 [employer]. Care instructions adapted under license by [employer] (which disclaims liability or warranty for this information). If you have questions about a medical condition or this instruction, always ask your healthcare professional. [address] any warranty or liability for your use of this information.

## 2020-07-15 NOTE — PROGRESS NOTES
Bariatric Surgery Consultation    Subjective: Ashley Carson is a 49 y.o. obese female with a Body mass index is 56.75 kg/m². Hortencia Mcintyre she desires surgery at this time because of health issues and quality of life issues. Ashley Carson has been seen by a bariatric nutritionist and has been placed on an appropriate low carbohydrate diet. The patient desires laparoscopic gastric bypass surgery for surgical weight loss, however she is here today to review their workup to date. Ashley Carson is here also today to check progress with weight loss / evaluate nutritional status and review all subspecialty clearances in hopes of proceeding to the operating room.      Patient Active Problem List    Diagnosis Date Noted    Former smoker     Morbid obesity ([facility] [address]     Morbid obesity with body mass index (BMI) of 50.0 to 59.9 in adult ([facility] [address]     Hypertension     Arthritic-like pain     Irregular menses     Functional dyspepsia       Past Surgical History:   Procedure Laterality Date    HX HERNIA REPAIR      umbilical     HX OTHER SURGICAL      repair of vaginal trauma related to childbirth      Social History     Tobacco Use    Smoking status: Former Smoker     Last attempt to quit: 2/3/2020     Years since quittin.4    Smokeless tobacco: Never Used   Substance Use Topics    Alcohol use: Not Currently      Family History   Problem Relation Age of Onset    Heart Disease Mother     Diabetes Mother     Liver Disease Father         No Known Allergies       Review of Systems:          General - No history or complaints of unexpected fever, chills, or weight loss  Head/Neck - No history or complaints of headache, diplopia, dysphagia, hearing loss  Cardiac - No history or complaints of chest pain, palpitations, murmur, or shortness of breath  Pulmonary - No history or complaints of shortness of breath, productive cough, hemoptysis  Gastrointestinal - mild reflux noted ,no  abdominal pain, obstipation/constipation or blood per rectum  Genitourinary - No history or complaints of hematuria/dysuria, stress urinary incontinence symptoms, or renal lithiasis  Musculoskeletal - + joint pain in their knees,  no muscular weakness  Hematologic - No history or complaints of bleeding disorders,  No blood transfusions  Neurologic - No history or complaints of  migraine headaches, seizure activity, syncopal episodes, TIA or stroke  Integumentary - No history or complaints of rashes, abnormal nevi, skin cancer  Gynecological - sporadic spotting    Objective:     Visit Vitals  /81 (BP 1 Location: Right arm, BP Patient Position: Sitting)   Pulse 96   Temp 96.8 °F (36 °C)   Ht 5' 4\" (1.626 m)   Wt 150 kg (330 lb 9.6 oz)   SpO2 96%   BMI 56.75 kg/m²     Physical Exam  Constitutional:       Appearance: She is obese. HENT:      Head: Normocephalic and atraumatic. Nose: Nose normal.      Mouth/Throat:      Mouth: Mucous membranes are moist.      Pharynx: Oropharynx is clear. Eyes:      Pupils: Pupils are equal, round, and reactive to light. Neck:      Musculoskeletal: Normal range of motion and neck supple. No muscular tenderness. Vascular: No carotid bruit. Cardiovascular:      Rate and Rhythm: Normal rate and regular rhythm. Heart sounds: No murmur. No friction rub. No gallop. Pulmonary:      Effort: Pulmonary effort is normal.      Breath sounds: Normal breath sounds. Abdominal:      General: Bowel sounds are normal. There is no distension. Palpations: Abdomen is soft. There is no mass. Tenderness: There is no abdominal tenderness. Hernia: No hernia is present. Musculoskeletal: Normal range of motion. General: No swelling or tenderness. Right lower leg: No edema. Left lower leg: No edema. Skin:     General: Skin is warm and dry. Neurological:      General: No focal deficit present. Mental Status: She is alert and oriented to person, place, and time.    Psychiatric:         Mood and Affect: Mood normal.           Labs:     No results found for this or any previous visit (from the past 2016 hour(s)). Assessment:     Morbid obesity with associated comorbidity & HTN    Plan:     Continuation of Pre-Operative evaluation / clearance. Elsa Gregorio would like to discuss her status as a surgical candidate.  her progress has been noted and reviewed. We will continue the pre-operative process and work towards goals as outlined. she has 10-15 more pounds to lose before proceeding to the OR.  (0 pounds lost since last visit)  she has 0 more nutritional visits to complete before proceeding to the OR  she has an outstanding cardiac, pulmonary, psychological and PCP clearance to review before proceeding to the OR. Patient is currently smoking cigarettes. She is going to go to her PCP for an smoking cessation aid. I explained why she needs to stop smoking. Elsa Gregorio understand the rationales for all the above. It has been discussed that given her current condition that the best surgical option for this patient would be the laparoscopic gastric bypass surgery. Elsa Gregorio agrees with the surgical choice and has been educated in it's; risks, benefits, and alternatives. We will continue with the pre-operative evaluation as needed to check progress. Patient states she has been working 24 hour shifts as a  and has actually gained weight. She also had stopped smoking, but has recently started smoking again. I explained why she needs to lose weight and quit smoking, so she would understand the importance of both. Secondary Diagnoses:     Dietary Intervention  - The patient is currently scheduled to see or has been followed by a bariatric nutritionist for an attempt at preoperative weight loss as has been dictated by their insurance carrier.     They will be assessed at various times during their follow up to evaluate their progress depending on the length of time that is required once again by their carrier. I have explained the importance of   preoperative weight loss and the benefits regarding lower surgical risk and also assisting the patient in reaching their weight loss goal. They understand also that they should participate in an  exercise program to assist in this weight loss. Finally they understand there is a physiologic benefit from the standpoint of hepatic volume reduction and reduction of central visceral adiposity   preoperatively. I have reiterated the importance of a low carbohydrate and high protein regimen to achieve their   stated goal. I have reviewed their current eating behavior prior to this encounter and explained to them in an exhaustive fashion the appropriate diet that they should adhere to. They have been   encouraged to loose weight pre operatively and understand it is our prerogative to cancel surgery or postpone their procedure in the event of significant weight gain. Obstructive Sleep Apnea -The patient understands the association of sleep apnea and obesity and the additional risk that it caries related to post surgical complications. We will have the patient complete a sleep study prior to surgery. If a CPAP is appropriate, we will have them continue using it while at home after surgery and follow up with their pulmonologist 6 months after to be retested to see if it can be discontinued at that time period.       Hypertension - The patient has a clear understanding of how weight loss improves hypertension as a whole, but also they understand that there is a significant genetic component   to this disease process.  We will monitor the patients blood pressure while in the hospital and the plan would be to continue those medications postoperatively.  If a diuretic is being   used we will stop them on discharge to prevent dehydration particularly with the sleeve gastrectomy and the gastric bypass procedures.  They will be instructed to monitor their blood   pressure postoperatively while at home and notify their primary care physician in the event of any significantly high or uncharacteristic readings. They understand that surgery may be  cancelled if their blood pressure is deemed out of control the day of surgery either by myself or the anesthesia department. For this reason they must take their medications as directed   and monitor their blood pressure regularly working up until their surgical date.     Weight Related Arthritis -The patient understands the benefits that weight loss surgery can have on their arthritis but also understands that weight loss is not a guaranteed cure and relief of symptoms is often dependent on the severity of the underlying disease.  The patient also understands that traditional pharmaceutical treatments for this diagnosis are usually unavailable to post-operative weight loss patients due to the effects on the gastrointestinal tract particularly with the gastric bypass and to a lesser effect with the sleeve gastrectomy.  Any changes to the patients medication treatment will ultimately be made the patients PCP with input by our office.     Possible or Existing Coronary Artery Disease - The patient has undergone or will undergo a preoperative evaluation by their cardiologist, or a cardiologist of their choice such that they are deemed   a reasonable candidate for surgery. The patient understands that with a history of cardiac disease that there is always an increased risk compared   to the average patient. Appropriate recommendations have been followed as recommended by the cardiologist.  The patients ASA will be resumed   approximately 1 month postoperatively in a coated form if appropriate. Dr. Alina Carrera is patient's cardiologist.    Smoking Cessation - Today I have counseled the patient extensively regarding smoking cessation for greater than 10 minutes.   They have been counseled extensively about the detrimental effects of smoking on their weight loss surgical procedure particularly for the gastric bypass and sleeve gastrectomy procedures. They understand that smoking leads to pulmonary issues postoperatively and can lead to gastric ulcers and marginal ulcers in the post bariatric surgery pouch that has been created. They understand that they must stop smoking 1 month at least prior to surgery or it may affect their ultimate progression to their procedure. They understand finally that labs may be obtained to prove that they have ceased smoking prior to surgery. Total time counseling was greater than 10 minutes.       Signed By: Jane Hernández     July 15, 2020

## 2020-08-07 DIAGNOSIS — E66.01 MORBID OBESITY (HCC): Primary | ICD-10-CM

## 2020-08-07 DIAGNOSIS — R06.83 SNORES: ICD-10-CM

## 2020-08-07 DIAGNOSIS — E66.01 MORBID OBESITY WITH BODY MASS INDEX (BMI) OF 50.0 TO 59.9 IN ADULT (HCC): ICD-10-CM

## 2020-09-02 ENCOUNTER — CLINICAL SUPPORT (OUTPATIENT)
Dept: SURGERY | Age: 55
End: 2020-09-02

## 2020-09-02 VITALS — BODY MASS INDEX: 50.02 KG/M2 | HEIGHT: 64 IN | WEIGHT: 293 LBS

## 2020-09-02 DIAGNOSIS — E66.01 MORBID OBESITY (HCC): Primary | ICD-10-CM
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
## 2020-10-16 ENCOUNTER — TELEPHONE (OUTPATIENT)
Dept: SURGERY | Age: 55
End: 2020-10-16

## 2020-10-16 NOTE — PROGRESS NOTES
Progress Note  10/16/20      I saw Ms. Gayle Ku today and reviewed her chart once again. Last time we saw Ms. Gayle Ku was in September. Her weight was relatively stable, I think she was overall 2-3 pounds down from her initial consult weight, but her biggest problem was that she still had not stopped smoking. She did see her family physician this week, was given Chantix, and she recently started on Chantix. I explained to the patient she has now had her pulmonary clearance, her cardiac clearance with a cardiac catheterization done at [facility], which was normal, and the only thing pending is for her to stop smoking and have a confirmatory nicotine level drawn that indicates that. She initially thought she wanted the sleeve gastrectomy, but then we considered the gastric bypass for a period of time due to her high BMI, but she understands that she must stop smoking for life and she is very hesitant to commit to that, and she would instead now wish to proceed only with a sleeve gastrectomy. I have explained to her that certainly we can proceed with surgery as soon as she is nicotine free.

## 2021-01-20 RX ORDER — AMLODIPINE BESYLATE 10 MG/1
10 TABLET ORAL DAILY
COMMUNITY

## 2021-01-20 RX ORDER — ALBUTEROL SULFATE 90 UG/1
2 AEROSOL, METERED RESPIRATORY (INHALATION)
COMMUNITY
End: 2021-08-30

## 2021-01-20 RX ORDER — VARENICLINE TARTRATE 1 MG/1
1 TABLET, FILM COATED ORAL 2 TIMES DAILY
COMMUNITY
End: 2021-08-30

## 2021-01-20 RX ORDER — GABAPENTIN 100 MG/1
100 CAPSULE ORAL 3 TIMES DAILY
COMMUNITY
End: 2021-07-20

## 2021-01-20 RX ORDER — UMECLIDINIUM BROMIDE AND VILANTEROL TRIFENATATE 62.5; 25 UG/1; UG/1
1 POWDER RESPIRATORY (INHALATION) DAILY
COMMUNITY
End: 2021-08-30

## 2021-01-22 ENCOUNTER — TRANSCRIBE ORDER (OUTPATIENT)
Dept: REGISTRATION | Age: 56
End: 2021-01-22

## 2021-01-22 ENCOUNTER — HOSPITAL ENCOUNTER (OUTPATIENT)
Dept: PREADMISSION TESTING | Age: 56
Discharge: HOME | End: 2021-01-22
Payer: COMMERCIAL

## 2021-01-22 DIAGNOSIS — Z01.812 BLOOD TESTS PRIOR TO TREATMENT OR PROCEDURE: ICD-10-CM

## 2021-01-22 DIAGNOSIS — Z01.812 BLOOD TESTS PRIOR TO TREATMENT OR PROCEDURE: Primary | ICD-10-CM

## 2021-01-22 PROCEDURE — U0003 INFECTIOUS AGENT DETECTION BY NUCLEIC ACID (DNA OR RNA); SEVERE ACUTE RESPIRATORY SYNDROME CORONAVIRUS 2 (SARS-COV-2) (CORONAVIRUS DISEASE [COVID-19]), AMPLIFIED PROBE TECHNIQUE, MAKING USE OF HIGH THROUGHPUT TECHNOLOGIES AS DESCRIBED BY CMS-2020-01-R: HCPCS

## 2021-01-23 LAB — SARS-COV-2, COV2NT: NOT DETECTED

## 2021-01-27 ENCOUNTER — ANESTHESIA EVENT (OUTPATIENT)
Dept: SURGERY | Age: 56
End: 2021-01-27
Payer: COMMERCIAL

## 2021-01-28 ENCOUNTER — ANESTHESIA (OUTPATIENT)
Dept: SURGERY | Age: 56
End: 2021-01-28
Payer: COMMERCIAL

## 2021-01-28 ENCOUNTER — HOSPITAL ENCOUNTER (OUTPATIENT)
Age: 56
Setting detail: OUTPATIENT SURGERY
Discharge: HOME | End: 2021-01-28
Attending: DENTIST | Admitting: DENTIST
Payer: COMMERCIAL

## 2021-01-28 VITALS
WEIGHT: 293 LBS | DIASTOLIC BLOOD PRESSURE: 88 MMHG | HEIGHT: 64 IN | SYSTOLIC BLOOD PRESSURE: 182 MMHG | RESPIRATION RATE: 20 BRPM | BODY MASS INDEX: 50.02 KG/M2 | TEMPERATURE: 97.7 F | HEART RATE: 73 BPM | OXYGEN SATURATION: 96 %

## 2021-01-28 LAB
AMPHET UR QL SCN: NEGATIVE
BARBITURATES UR QL SCN: NEGATIVE
BENZODIAZ UR QL: NEGATIVE
CANNABINOIDS UR QL SCN: NEGATIVE
COCAINE UR QL SCN: POSITIVE
HCG UR QL: NEGATIVE
HCG UR QL: NEGATIVE
HDSCOM,HDSCOM: ABNORMAL
METHADONE UR QL: NEGATIVE
OPIATES UR QL: NEGATIVE
PCP UR QL: NEGATIVE

## 2021-01-28 PROCEDURE — 80307 DRUG TEST PRSMV CHEM ANLYZR: CPT

## 2021-01-28 PROCEDURE — 74011250636 HC RX REV CODE- 250/636: Performed by: NURSE ANESTHETIST, CERTIFIED REGISTERED

## 2021-01-28 PROCEDURE — 74011250637 HC RX REV CODE- 250/637: Performed by: NURSE ANESTHETIST, CERTIFIED REGISTERED

## 2021-01-28 PROCEDURE — 81025 URINE PREGNANCY TEST: CPT

## 2021-01-28 RX ORDER — FAMOTIDINE 20 MG/1
20 TABLET, FILM COATED ORAL ONCE
Status: COMPLETED | OUTPATIENT
Start: 2021-01-28 | End: 2021-01-28

## 2021-01-28 RX ORDER — LIDOCAINE HYDROCHLORIDE 10 MG/ML
0.1 INJECTION, SOLUTION EPIDURAL; INFILTRATION; INTRACAUDAL; PERINEURAL AS NEEDED
Status: DISCONTINUED | OUTPATIENT
Start: 2021-01-28 | End: 2021-01-28 | Stop reason: HOSPADM

## 2021-01-28 RX ORDER — SODIUM CHLORIDE, SODIUM LACTATE, POTASSIUM CHLORIDE, CALCIUM CHLORIDE 600; 310; 30; 20 MG/100ML; MG/100ML; MG/100ML; MG/100ML
25 INJECTION, SOLUTION INTRAVENOUS CONTINUOUS
Status: DISCONTINUED | OUTPATIENT
Start: 2021-01-28 | End: 2021-01-28 | Stop reason: HOSPADM

## 2021-01-28 RX ADMIN — FAMOTIDINE 20 MG: 20 TABLET, FILM COATED ORAL at 08:44

## 2021-01-28 NOTE — PERIOP NOTES
Dr. Phylicia Green spoke with pt. R/T procedure cancelled. Patient was informed R/T nurse printing Discharge instruction.  Patient Left unit without signing Discharge instrctions

## 2021-01-28 NOTE — H&P
Day of Surgery Update: Abdelrahman Acosta was seen and examined. History and physical has been reviewed. The patient has been examined. There have been no significant clinical changes since the completion of the originally dated History and Physical.    Pt only wants all lower teeth removed and only one upper tooth (back right left #5). Explained leaving remaining top teeth except #5. Pt agrees and initialed consent. Signed By: Ta Berg DDS     January 28, 2021 8:30 AM       NOTE: pt positive for cocaine. Case cancelled by anesthesia.   drg

## 2021-01-28 NOTE — DISCHARGE INSTRUCTIONS
Jamie Oneal    Call Dr. Wright [facility] office to reschedule procedure          These are general instructions for a healthy lifestyle:    No smoking/ No tobacco products/ Avoid exposure to second hand smoke    Surgeon General's Warning:  Quitting smoking now greatly reduces serious risk to your health. Obesity, smoking, and sedentary lifestyle greatly increases your risk for illness    A healthy diet, regular physical exercise & weight monitoring are important for maintaining a healthy lifestyle    You may be retaining fluid if you have a history of heart failure or if you experience any of the following symptoms:  Weight gain of 3 pounds or more overnight or 5 pounds in a week, increased swelling in our hands or feet or shortness of breath while lying flat in bed. Please call your doctor as soon as you notice any of these symptoms; do not wait until your next office visit. Recognize signs and symptoms of STROKE:    F-face looks uneven    A-arms unable to move or move unevenly    S-speech slurred or non-existent    T-time-call 911 as soon as signs and symptoms begin-DO NOT go       Back to bed or wait to see if you get better-TIME IS BRAIN. Warning Signs of HEART ATTACK     Call 911 if you have these symptoms:   Chest discomfort. Most heart attacks involve discomfort in the center of the chest that lasts more than a few minutes, or that goes away and comes back. It can feel like uncomfortable pressure, squeezing, fullness, or pain.  Discomfort in other areas of the upper body. Symptoms can include pain or discomfort in one or both arms, the back, neck, jaw, or stomach.  Shortness of breath with or without chest discomfort.  Other signs may include breaking out in a cold sweat, nausea, or lightheadedness. Don't wait more than five minutes to call 911 - MINUTES MATTER! Fast action can save your life.  Calling 911 is almost always the fastest way to get lifesaving treatment. Emergency Medical Services staff can begin treatment when they arrive -- up to an hour sooner than if someone gets to the hospital by car. The discharge information has been reviewed with the {PATIENT PARENT GUARDIAN:[id number]}. The {PATIENT PARENT GUARDIAN:[id number]} verbalized understanding.     Patient armband removed and shredded

## 2021-06-15 DIAGNOSIS — Z01.812 BLOOD TESTS PRIOR TO TREATMENT OR PROCEDURE: ICD-10-CM

## 2021-06-15 DIAGNOSIS — E66.01 MORBID OBESITY (HCC): ICD-10-CM

## 2021-06-15 DIAGNOSIS — I10 ESSENTIAL HYPERTENSION: Primary | ICD-10-CM

## 2021-07-23 ENCOUNTER — HOSPITAL ENCOUNTER (OUTPATIENT)
Dept: PREADMISSION TESTING | Age: 56
Discharge: HOME | End: 2021-07-23
Payer: COMMERCIAL

## 2021-07-23 ENCOUNTER — TRANSCRIBE ORDER (OUTPATIENT)
Dept: REGISTRATION | Age: 56
End: 2021-07-23

## 2021-07-23 DIAGNOSIS — E66.01 MORBID OBESITY (HCC): ICD-10-CM

## 2021-07-23 DIAGNOSIS — I10 ESSENTIAL HYPERTENSION, MALIGNANT: ICD-10-CM

## 2021-07-23 DIAGNOSIS — I10 ESSENTIAL HYPERTENSION, MALIGNANT: Primary | ICD-10-CM

## 2021-07-23 LAB
ABO + RH BLD: NORMAL
ALBUMIN SERPL-MCNC: 3.6 G/DL (ref 3.4–5)
ALBUMIN/GLOB SERPL: 1 {RATIO} (ref 0.8–1.7)
ALP SERPL-CCNC: 107 U/L (ref 45–117)
ALT SERPL-CCNC: 8 U/L (ref 13–56)
ANION GAP SERPL CALC-SCNC: 3 MMOL/L (ref 3–18)
AST SERPL-CCNC: 9 U/L (ref 10–38)
BASOPHILS # BLD: 0 K/UL (ref 0–0.1)
BASOPHILS NFR BLD: 0 % (ref 0–2)
BILIRUB SERPL-MCNC: 0.2 MG/DL (ref 0.2–1)
BLOOD GROUP ANTIBODIES SERPL: NORMAL
BUN SERPL-MCNC: 12 MG/DL (ref 7–18)
BUN/CREAT SERPL: 12 (ref 12–20)
CALCIUM SERPL-MCNC: 9.5 MG/DL (ref 8.5–10.1)
CHLORIDE SERPL-SCNC: 112 MMOL/L (ref 100–111)
CO2 SERPL-SCNC: 27 MMOL/L (ref 21–32)
CREAT SERPL-MCNC: 0.99 MG/DL (ref 0.6–1.3)
DIFFERENTIAL METHOD BLD: ABNORMAL
EOSINOPHIL # BLD: 0.1 K/UL (ref 0–0.4)
EOSINOPHIL NFR BLD: 1 % (ref 0–5)
ERYTHROCYTE [DISTWIDTH] IN BLOOD BY AUTOMATED COUNT: 18.8 % (ref 11.6–14.5)
GLOBULIN SER CALC-MCNC: 3.7 G/DL (ref 2–4)
GLUCOSE SERPL-MCNC: 100 MG/DL (ref 74–99)
HCG SERPL QL: NEGATIVE
HCT VFR BLD AUTO: 43 % (ref 35–45)
HGB BLD-MCNC: 13.2 G/DL (ref 12–16)
LYMPHOCYTES # BLD: 1.3 K/UL (ref 0.9–3.6)
LYMPHOCYTES NFR BLD: 25 % (ref 21–52)
MCH RBC QN AUTO: 24.1 PG (ref 24–34)
MCHC RBC AUTO-ENTMCNC: 30.7 G/DL (ref 31–37)
MCV RBC AUTO: 78.5 FL (ref 74–97)
MONOCYTES # BLD: 0.4 K/UL (ref 0.05–1.2)
MONOCYTES NFR BLD: 8 % (ref 3–10)
NEUTS SEG # BLD: 3.5 K/UL (ref 1.8–8)
NEUTS SEG NFR BLD: 65 % (ref 40–73)
PLATELET # BLD AUTO: 307 K/UL (ref 135–420)
PMV BLD AUTO: 9.2 FL (ref 9.2–11.8)
POTASSIUM SERPL-SCNC: 4.1 MMOL/L (ref 3.5–5.5)
PROT SERPL-MCNC: 7.3 G/DL (ref 6.4–8.2)
RBC # BLD AUTO: 5.48 M/UL (ref 4.2–5.3)
SODIUM SERPL-SCNC: 142 MMOL/L (ref 136–145)
SPECIMEN EXP DATE BLD: NORMAL
WBC # BLD AUTO: 5.3 K/UL (ref 4.6–13.2)

## 2021-07-23 PROCEDURE — 80053 COMPREHEN METABOLIC PANEL: CPT

## 2021-07-23 PROCEDURE — 86901 BLOOD TYPING SEROLOGIC RH(D): CPT

## 2021-07-23 PROCEDURE — 84703 CHORIONIC GONADOTROPIN ASSAY: CPT

## 2021-07-23 PROCEDURE — 93005 ELECTROCARDIOGRAM TRACING: CPT

## 2021-07-23 PROCEDURE — 85025 COMPLETE CBC W/AUTO DIFF WBC: CPT

## 2021-07-23 PROCEDURE — 36415 COLL VENOUS BLD VENIPUNCTURE: CPT

## 2021-07-24 LAB
ATRIAL RATE: 74 BPM
CALCULATED P AXIS, ECG09: 65 DEGREES
CALCULATED R AXIS, ECG10: 67 DEGREES
CALCULATED T AXIS, ECG11: 75 DEGREES
DIAGNOSIS, 93000: NORMAL
P-R INTERVAL, ECG05: 192 MS
Q-T INTERVAL, ECG07: 436 MS
QRS DURATION, ECG06: 82 MS
QTC CALCULATION (BEZET), ECG08: 483 MS
VENTRICULAR RATE, ECG03: 74 BPM

## 2021-07-26 ENCOUNTER — TELEPHONE (OUTPATIENT)
Dept: SURGERY | Age: 56
End: 2021-07-26

## 2021-07-26 ENCOUNTER — OFFICE VISIT (OUTPATIENT)
Dept: SURGERY | Age: 56
End: 2021-07-26
Payer: COMMERCIAL

## 2021-07-26 VITALS
HEIGHT: 64 IN | OXYGEN SATURATION: 97 % | WEIGHT: 293 LBS | HEART RATE: 75 BPM | BODY MASS INDEX: 50.02 KG/M2 | RESPIRATION RATE: 16 BRPM | DIASTOLIC BLOOD PRESSURE: 70 MMHG | SYSTOLIC BLOOD PRESSURE: 151 MMHG

## 2021-07-26 DIAGNOSIS — Z87.891 FORMER SMOKER: ICD-10-CM

## 2021-07-26 DIAGNOSIS — G89.18 POST-OP PAIN: Primary | ICD-10-CM

## 2021-07-26 DIAGNOSIS — M25.50 ARTHRALGIA, UNSPECIFIED JOINT: ICD-10-CM

## 2021-07-26 DIAGNOSIS — E66.01 MORBID OBESITY WITH BODY MASS INDEX (BMI) OF 50.0 TO 59.9 IN ADULT (HCC): ICD-10-CM

## 2021-07-26 DIAGNOSIS — I10 ESSENTIAL HYPERTENSION: ICD-10-CM

## 2021-07-26 DIAGNOSIS — N92.6 IRREGULAR MENSES: ICD-10-CM

## 2021-07-26 DIAGNOSIS — E66.01 MORBID OBESITY (HCC): ICD-10-CM

## 2021-07-26 PROCEDURE — 99215 OFFICE O/P EST HI 40 MIN: CPT | Performed by: SPECIALIST

## 2021-07-26 RX ORDER — OXYCODONE AND ACETAMINOPHEN 5; 325 MG/1; MG/1
1 TABLET ORAL
Qty: 30 TABLET | Refills: 0 | Status: SHIPPED | OUTPATIENT
Start: 2021-07-26 | End: 2021-08-02

## 2021-07-26 RX ORDER — ENOXAPARIN SODIUM 100 MG/ML
40 INJECTION SUBCUTANEOUS EVERY 12 HOURS
Qty: 28 SYRINGE | Refills: 0 | Status: SHIPPED | OUTPATIENT
Start: 2021-07-26 | End: 2021-08-09

## 2021-07-26 NOTE — PROGRESS NOTES
Sleeve Gastrectomy - History and Physical    Subjective: The patient is a 48 y.o. obese female with a Body mass index is 57.3 kg/m². .   she presents now to review their work up to date to see if they are a candidate for surgery and whether or not to proceed with the previously requested procedure. Bariatric comorbidities continue to include:   Patient Active Problem List   Diagnosis Code    [facility] ([facility] E66.01    Morbid obesity with body mass index (BMI) of 50.0 to 59.9 in adult ([facility] E66.01, Z68.43    Hypertension I10    Arthritic-like pain M25.50    Irregular menses N92.6    Functional dyspepsia K30    Former smoker Z87.891       They have been generally well prior to this visit and have had no recent significant illnesses. The patient has had no gastrointestinal issues that would preclude them from proceeding with the surgery they have chosen. Wing Harrison has recently tried a preoperative weight loss program  in addition to seeing a bariatric nutritionist preoperatively. We have discussed on at least one other occasion about the various types of surgical weight loss procedures and they have considered these options after our initial consultation. We have once again discussed these procedures in detail and they have now decided on a surgical procedure. They present today to discuss this and confirm that their evaluation pre operatively is acceptable to continue with surgery. The patient desires laparoscopic sleeve gastrectomy for surgical weight loss. The patients goal weight is 190lb. These goals are consistent with expected outcomes of their desired operation. her Medical goals are resolution of these health issues.     Patient Active Problem List    Diagnosis Date Noted    Former smoker     Morbid obesity ([facility]     Morbid obesity with body mass index (BMI) of 50.0 to 59.9 in adult ([facility]     Hypertension     Arthritic-like pain     Irregular menses     Functional dyspepsia Past Surgical History:   Procedure Laterality Date    HX HERNIA REPAIR      umbilical     HX OTHER SURGICAL      repair of vaginal trauma related to childbirth      Social History     Tobacco Use    Smoking status: Former Smoker     Quit date: 2021     Years since quittin.4    Smokeless tobacco: Never Used   Substance Use Topics    Alcohol use: Not Currently      Family History   Problem Relation Age of Onset    Heart Disease Mother     Diabetes Mother     Liver Disease Father       Current Outpatient Medications   Medication Sig Dispense Refill    oxyCODONE-acetaminophen (PERCOCET) 5-325 mg per tablet Take 1 Tablet by mouth every four (4) hours as needed for Pain for up to 7 days. Max Daily Amount: 6 Tablets. 30 Tablet 0    amLODIPine (NORVASC) 10 mg tablet Take 10 mg by mouth daily.  enoxaparin (LOVENOX) 40 mg/0.4 mL 0.4 mL by SubCUTAneous route every twelve (12) hours every twelve (12) hours for 14 days. Indications: deep vein thrombosis prevention (Patient not taking: Reported on 2021) 28 Syringe 0    varenicline (Chantix) 1 mg tablet Take 1 mg by mouth two (2) times a day. Indications: stop smoking (Patient not taking: Reported on 2021)      albuterol (PROVENTIL HFA, VENTOLIN HFA, PROAIR HFA) 90 mcg/actuation inhaler Take 2 Puffs by inhalation every six (6) hours as needed for Wheezing. (Patient not taking: Reported on 2021)      umeclidinium-vilanteroL (Anoro Ellipta) 62.5-25 mcg/actuation inhaler Take 1 Puff by inhalation daily.  (Patient not taking: Reported on 2021)       No Known Allergies       Review of Systems:     General - No history or complaints of unexpected fever, chills, or weight loss  Head/Neck - No history or complaints of headache, diplopia, dysphagia, hearing loss  Cardiac - No history or complaints of chest pain, palpitations, murmur, or shortness of breath  Pulmonary - No history or complaints of shortness of breath, productive cough, hemoptysis  Gastrointestinal - minimal reflux,no  abdominal pain, obstipation/constipation or blood per rectum  Genitourinary - No history or complaints of hematuria/dysuria, stress urinary incontinence symptoms, or renal lithiasis  Musculoskeletal - moderate joint pain ,  no muscular weakness  Hematologic - No history or complaints of bleeding disorders,  No blood transfusions  Neurologic - No history or complaints of  migraine headaches, seizure activity, syncopal episodes, TIA or stroke  Integumentary - No history or complaints of rashes, abnormal nevi, skin cancer  Gynecological - n/a               Objective:     Visit Vitals  BP (!) 151/70 Comment: didnt take bp meds today   Pulse 75   Resp 16   Ht 5' 4\" (1.626 m)   Wt 151.4 kg (333 lb 12.8 oz)   SpO2 97%   BMI 57.30 kg/m²       Physical Examination: General appearance - alert, well appearing, and in no distress and oriented to person, place, and time  Mental status - alert, oriented to person, place, and time, normal mood, behavior, speech, dress, motor activity, and thought processes  Eyes - pupils equal and reactive, extraocular eye movements intact, sclera anicteric, left eye normal, right eye normal  Ears - right ear normal, left ear normal  Nose - normal and patent, no erythema, discharge or polyps  Mouth - mucous membranes moist, pharynx normal without lesions  Neck - supple, no significant adenopathy  Lymphatics - no palpable lymphadenopathy, no hepatosplenomegaly  Chest - clear to auscultation, no wheezes, rales or rhonchi, symmetric air entry  Heart - normal rate, regular rhythm, normal S1, S2, no murmurs, rubs, clicks or gallops  Abdomen - soft, nontender, nondistended, no masses or organomegaly  Back exam - full range of motion, no tenderness, palpable spasm or pain on motion  Neurological - alert, oriented, normal speech, no focal findings or movement disorder noted  Musculoskeletal - no joint tenderness, deformity or swelling  Extremities - peripheral pulses normal, no pedal edema, no clubbing or cyanosis  Skin - normal coloration and turgor, no rashes, no suspicious skin lesions noted    Labs :     Lab Results   Component Value Date/Time    WBC 5.3 2021 02:34 PM    HGB 13.2 2021 02:34 PM    HCT 43.0 2021 02:34 PM    PLATELET 940  02:34 PM    MCV 78.5 2021 02:34 PM     Lab Results   Component Value Date/Time    Sodium 142 2021 02:34 PM    Potassium 4.1 2021 02:34 PM    Chloride 112 (H) 2021 02:34 PM    CO2 27 2021 02:34 PM    Anion gap 3 2021 02:34 PM    Glucose 100 (H) 2021 02:34 PM    BUN 12 2021 02:34 PM    Creatinine 0.99 2021 02:34 PM    BUN/Creatinine ratio 12 2021 02:34 PM    GFR est AA >60 2021 02:34 PM    GFR est non-AA 58 (L) 2021 02:34 PM    Calcium 9.5 2021 02:34 PM    Bilirubin, total 0.2 2021 02:34 PM    Alk. phosphatase 107 2021 02:34 PM    Protein, total 7.3 2021 02:34 PM    Albumin 3.6 2021 02:34 PM    Globulin 3.7 2021 02:34 PM    A-G Ratio 1.0 2021 02:34 PM    ALT (SGPT) 8 (L) 2021 02:34 PM     No results found for: IRON, FE, TIBC, IBCT, PSAT, FERR  No results found for: FOL, RBCF  No results found for: Faraz Covington [id number]            Cardiac / Pulmonary Evaluation:           UGI Results:     MARITZA Chatman   Physician Assistant   Physician Assistant   Procedures       Signed   Date of Service:  07/15/20 1405                    []Hide copied text    []Hover for details  Patient: Kerry Fabian      : 1965  Medical Record JRXOIR:[id number]                 PREPROCEDURE DIAGNOSIS: This patient is preoperative for laparoscopic gastric bypass surgeryprocedure with a history of  reflux disease.     POSTPROCEDURE DIAGNOSIS: This patient is preoperative for laparoscopic gastric bypass surgeryprocedure with a history of  reflux disease.        PROCEDURES PERFORMED: Upper GI study with barium.     ESTIMATED BLOOD LOSS: None.     SPECIMENS: None.     STATEMENT OF MEDICAL NECESSITY: The patient is a patient with a  longstanding history of obesity. They are now considering the laparoscopic gastric bypass surgeryprocedure as a means of surgical weight control and due to their history of reflux disease and are being assessed preoperatively for such.     DESCRIPTION OF PROCEDURE: The patient was brought to the fluoroscopy unit and  was given thin barium. On swallowing of barium, they were noted to have  normal peristalsis of their esophagus. They had prompt filling of distal  esophagus with tapering into the gastroesophageal junction. There was no evidence of a hiatal hernia present. Contrast then filled the gastric cardia, fundus,body and pre pyloric region with no abnormalities noted. Contrast then exited the pylorus in normal fashion. No obstruction was noted. There was no evidence of reflux noted.     (normal anatomy)     Tavares Cummins MD              Assessment:     Morbid obesity with comorbidity    Plan:     laparoscopic sleeve gastrectomy    This is a 48 y.o. female with a BMI of Body mass index is 57.3 kg/m². and the weight-related co-morbidties as noted above. Nichole Serrano meets the NIH criteria for bariatric surgery based upon the BMI of Body mass index is 57.3 kg/m². and multiple weight-related co-morbidties. Nichole Serrano has elected laparoscopic sleeve gastrectomy as her intervention of choice for treatment of morbid obestiy through surgical means secondary to its safety profile, rapid return to work  and decreases in operative risks over gastric bypass. In the office today, following Socorro's history and physical examination, a 40 minute discussion regarding the anatomic alterations for the laparoscopic sleeve gastrectomy was undertaken.  The dietary expectations and the patient  dependent factors for success were thoroughly discussed, to include the need for interval follow-up and long-term dietary changes associated with success. The possible complications of the sleeve gastrectomy  were also discussed, to include;death, DVT/PE, staple line leak, bleeding, stricture formation, infection, nutritional deficiencies and sleeve dilation. Specific weight related outcomes for success were also discussed with an emphasis on careful and close follow-up with the first year and eating behavior modification as the baseline and cyclical hunger return. The patient expressed an understanding of the above factors, and her questions were answered in their entirety. In addition, the patient attended a 1.5 hour power point seminar regarding obesity, surgical weight loss including, adjustable gastric band, gastric bypass, and sleeve gastrectomy. This discussion contrasted the different surgical techniques, mechanisms of actions and expected outcomes, and surgical and medical risks associated with each procedure. During this seminar, there was a long question and answer session where each questions was answered until there were no additional questions. Today, the patient had all of her questions answered and the decision was made today that the patient's preoperative evaluation is acceptable for them  to proceed with bariatric surgery  choosing the sleeve gastrectomy as her surgical option. Secondary Diagnoses:     DVT / Pulmonary Embolus Risk - The patient is at a higher risk for post operative DVT / pulmonary embolus secondary to their morbid obese status, relative sedentary   lifestyle, and impending general anesthetic. We will plan to use anticoagulation therapy pre and post operative as well as TEDs and  pneumatic compression devices and   encourage ambulation once on the hospital nursing floor. The need for  at home anticoagulation therapy has also been discussed.  The patient understands   that their efforts at ambulation are of vital importance to reduce the risk of this complication thus placing significant burden on them as to the prevention of such issues. Signs and symptoms of DVT / PE have been discussed with the patient and they have been instructed to call the office if any these occur in the \"at home\" post op phase. The patient has been provided with a post operative prescription of Lovenox and instructed in its use for DVT prophylaxis. Hypertension - The patient has a clear understanding of how weight loss improves hypertension as a whole, but also they understand that there is a significant genetic component   to this disease process. We will monitor the patients blood pressure while in the hospital and the plan would be to continue those medications postoperatively. If a diuretic is being   used we will stop them on discharge to prevent dehydration particularly with the sleeve gastrectomy and the gastric bypass procedures. They will be instructed to monitor their blood   pressure postoperatively while at home and notify their primary care physician in the event of any significantly high or uncharacteristic readings. They understand that surgery may be  cancelled if their blood pressure is deemed out of control the day of surgery either by myself or the anesthesia department. For this reason they must take their medications as directed   and monitor their blood pressure regularly working up until their surgical date. Smoking Cessation - Today I have counseled the patient extensively regarding smoking cessation for greater than 10 minutes. They have been counseled extensively about the detrimental effects of smoking on their weight loss surgical procedure particularly for the gastric bypass and sleeve gastrectomy procedures. They understand that smoking leads to pulmonary issues postoperatively and can lead to gastric ulcers and marginal ulcers in the post bariatric surgery pouch that has been created.   They understand that they must stop smoking 1 month at least prior to surgery or it may affect their ultimate progression to their procedure. They understand finally that labs may be obtained to prove that they have ceased smoking prior to surgery. Total time counseling was greater than 10 minutes.   Signed By: Mandi Sanders MD     July 26, 2021

## 2021-07-29 ENCOUNTER — HOSPITAL ENCOUNTER (OUTPATIENT)
Dept: PREADMISSION TESTING | Age: 56
Discharge: HOME | End: 2021-07-29
Payer: COMMERCIAL

## 2021-07-29 PROCEDURE — U0003 INFECTIOUS AGENT DETECTION BY NUCLEIC ACID (DNA OR RNA); SEVERE ACUTE RESPIRATORY SYNDROME CORONAVIRUS 2 (SARS-COV-2) (CORONAVIRUS DISEASE [COVID-19]), AMPLIFIED PROBE TECHNIQUE, MAKING USE OF HIGH THROUGHPUT TECHNOLOGIES AS DESCRIBED BY CMS-2020-01-R: HCPCS

## 2021-07-30 LAB — SARS-COV-2, COV2NT: NOT DETECTED

## 2021-08-02 ENCOUNTER — TELEPHONE (OUTPATIENT)
Dept: SURGERY | Age: 56
End: 2021-08-02

## 2021-08-02 NOTE — TELEPHONE ENCOUNTER
RN spoke with patient pre-operatively to review pre-op education. Patient received emailed education to include the following: bariatric book, liquid diet slides, medical presentation, and nutritional presentation. Patient was able to view. Questions were answered in their entirety. Patient was reminded of a clear liquid diet the day before surgery and choices were reviewed, as well as nothing to eat and/or drink after midnight the day before, with the exception of a little bit of water with any medications that may need to be taken the morning of surgery. Patient verbalized understanding. Patient was given a bariatric book during pre-op appointment; electronic book sent, as well. Lovenox prescription: sent electronically  Percocet prescription: Dr. Joyce Beebe will send electronically day of discharge  Prilosec: Patient informed to purchase a two month supply. Children's complete chewable multi-vitamin: Patient informed to purchase. Probiotic: Patient informed to purchase at least a 1 billion strain. Patient was encouraged to contact the office with further questions.

## 2021-08-19 ENCOUNTER — HOSPITAL ENCOUNTER (OUTPATIENT)
Dept: PREADMISSION TESTING | Age: 56
Discharge: HOME | End: 2021-08-19

## 2021-08-24 ENCOUNTER — HOSPITAL ENCOUNTER (OUTPATIENT)
Dept: PREADMISSION TESTING | Age: 56
Discharge: HOME | End: 2021-08-24

## 2021-08-30 ENCOUNTER — TELEPHONE (OUTPATIENT)
Dept: SURGERY | Age: 56
End: 2021-08-30

## 2021-08-30 ENCOUNTER — HOSPITAL ENCOUNTER (OUTPATIENT)
Dept: PREADMISSION TESTING | Age: 56
Discharge: HOME | End: 2021-08-30

## 2021-08-30 VITALS — WEIGHT: 293 LBS | BODY MASS INDEX: 50.02 KG/M2 | HEIGHT: 64 IN

## 2021-08-30 RX ORDER — NYSTATIN 100000 [USP'U]/ML
500000 SUSPENSION ORAL
Status: CANCELLED | OUTPATIENT
Start: 2021-08-30 | End: 2021-08-30

## 2021-08-30 RX ORDER — ACETAMINOPHEN 500 MG
1000 TABLET ORAL ONCE
Status: CANCELLED | OUTPATIENT
Start: 2021-08-30 | End: 2021-08-30

## 2021-08-30 RX ORDER — ENOXAPARIN SODIUM 100 MG/ML
40 INJECTION SUBCUTANEOUS EVERY 12 HOURS
Qty: 28 SYRINGE | Refills: 0 | Status: SHIPPED | OUTPATIENT
Start: 2021-08-30 | End: 2021-09-13

## 2021-08-30 RX ORDER — ENOXAPARIN SODIUM 100 MG/ML
40 INJECTION SUBCUTANEOUS ONCE
Status: CANCELLED | OUTPATIENT
Start: 2021-08-30 | End: 2021-08-30

## 2021-08-30 RX ORDER — FAMOTIDINE 20 MG/50ML
20 INJECTION, SOLUTION INTRAVENOUS ONCE
Status: CANCELLED | OUTPATIENT
Start: 2021-08-30 | End: 2021-08-30

## 2021-08-30 RX ORDER — SODIUM CHLORIDE, SODIUM LACTATE, POTASSIUM CHLORIDE, CALCIUM CHLORIDE 600; 310; 30; 20 MG/100ML; MG/100ML; MG/100ML; MG/100ML
125 INJECTION, SOLUTION INTRAVENOUS CONTINUOUS
Status: CANCELLED | OUTPATIENT
Start: 2021-08-30

## 2021-08-30 NOTE — TELEPHONE ENCOUNTER
Pre-op resources resent. Left VM for patient to contact this RN if she did not have Lovenox injections from prior surgery date.

## 2021-08-30 NOTE — PERIOP NOTES
Unable to reach patient for PAT appointment, VM left requesting call back. 5th attempt over the last week.

## 2021-08-30 NOTE — PERIOP NOTES
Covid guidelines reviewed. Self-Quarantine reviewed and patient instructed to report any symptoms of Covid to Surgeon, or report to surgeon if unable to comply with guidelines. Patient instructed to leave all valuables at home including cell phone. Patient to come for Covid test on 9-7.

## 2021-08-30 NOTE — PERIOP NOTES
CHG kit process reviewed and patient reminded to include moody. Patient advised due to potential surgery delays, schedule changes,  and emergencies, should plan on being at hospital  all day. Does not meet special population criteria at this time.  Patient to  CHG kit at time of covid testing

## 2021-09-02 ENCOUNTER — TELEPHONE (OUTPATIENT)
Dept: SURGERY | Age: 56
End: 2021-09-02

## 2021-09-02 NOTE — TELEPHONE ENCOUNTER
RN left a VM message re: pre-op education below:    Patient received emailed education to include the following: bariatric book, liquid diet slides, medical presentation, and nutritional presentation. Patient was reminded of a clear liquid diet the day before surgery and choices were reviewed, as well as nothing to eat and/or drink after midnight the day before, with the exception of a little bit of water with any medications that may need to be taken the morning of surgery. Patient was given a bariatric book during pre-op appointment; electronic book sent, as well. Lovenox prescription: sent electronically  Percocet prescription: Dr. Tavia Chacon will send electronically day of discharge  Prilosec: Patient informed to purchase a two month supply. Children's complete chewable multi-vitamin: Patient informed to purchase. Probiotic: Patient informed to purchase at least a 1 billion strain. Patient was encouraged to contact the office with further questions.

## 2021-09-07 ENCOUNTER — HOSPITAL ENCOUNTER (OUTPATIENT)
Dept: PREADMISSION TESTING | Age: 56
Discharge: HOME | End: 2021-09-07
Payer: COMMERCIAL

## 2021-09-07 PROCEDURE — U0003 INFECTIOUS AGENT DETECTION BY NUCLEIC ACID (DNA OR RNA); SEVERE ACUTE RESPIRATORY SYNDROME CORONAVIRUS 2 (SARS-COV-2) (CORONAVIRUS DISEASE [COVID-19]), AMPLIFIED PROBE TECHNIQUE, MAKING USE OF HIGH THROUGHPUT TECHNOLOGIES AS DESCRIBED BY CMS-2020-01-R: HCPCS

## 2021-09-08 LAB — SARS-COV-2, NAA: NOT DETECTED

## 2021-11-08 ENCOUNTER — OFFICE VISIT (OUTPATIENT)
Dept: SURGERY | Age: 56
End: 2021-11-08
Payer: COMMERCIAL

## 2021-11-08 VITALS
OXYGEN SATURATION: 99 % | HEART RATE: 76 BPM | HEIGHT: 64 IN | TEMPERATURE: 97.6 F | DIASTOLIC BLOOD PRESSURE: 77 MMHG | WEIGHT: 293 LBS | BODY MASS INDEX: 50.02 KG/M2 | SYSTOLIC BLOOD PRESSURE: 130 MMHG

## 2021-11-08 DIAGNOSIS — Z87.891 FORMER SMOKER: ICD-10-CM

## 2021-11-08 DIAGNOSIS — R06.09 DYSPNEA ON MINIMAL EXERTION: ICD-10-CM

## 2021-11-08 DIAGNOSIS — E66.01 MORBID OBESITY WITH BODY MASS INDEX (BMI) OF 50.0 TO 59.9 IN ADULT (HCC): ICD-10-CM

## 2021-11-08 DIAGNOSIS — I10 PRIMARY HYPERTENSION: ICD-10-CM

## 2021-11-08 DIAGNOSIS — E66.01 MORBID OBESITY (HCC): Primary | ICD-10-CM

## 2021-11-08 DIAGNOSIS — M25.50 ARTHRALGIA, UNSPECIFIED JOINT: ICD-10-CM

## 2021-11-08 PROCEDURE — 99214 OFFICE O/P EST MOD 30 MIN: CPT | Performed by: NURSE PRACTITIONER

## 2021-11-08 NOTE — PATIENT INSTRUCTIONS
Supplement Resource Guide    Importance of Protein:   Maintains lean body mass, produces antibodies to fight off infections, heals wounds, minimizes hair loss, helps to give you energy, helps with satiety, and keeping you full between meals. Importance of Calcium:  Needed for healthy bones and teeth, normal blood clotting, and nervous system functioning, higher risk of osteoporosis and bone disease with non-compliance. Importance of Multivitamins: Many functions. Supply you with extra nutrients that you may be missing from food. May lead to iron deficiency anemia, weakness, fatigue, and many other symptoms with non-compliance. Importance of B Vitamins:  Important for red blood cell formation, metabolism, energy, and helps to maintain a healthy nervous system. Protein Supplement  Find one you like now. Use immediately after surgery. Look for:  35-50g protein each day from your protein supplement once you reach the progression diet. 0-3 g fat per serving  0-3 g sugar per serving    Protein drinks should be split in separate dosages. Recommend: Lifelong  1 year + Calcium Supplement:     Start taking within a month after surgery. Look for: Calcium Citrate Plus D (1500 mg per day)  Recommend: Citracal     .            Avoid chocolate chewable calcium. Can use chewable bariatric or [employer] brand or similar chewable. The body cannot absorb more than 500-600 mg of calcium at a time. Take for Life Multi-vitamin Supplement:      Start immediately after surgery: any complete chewable, such as: [place]s Complete chewables. Avoid [place] sours or gummies. They lack iron and other important nutrients and also have added sugar. Continue with chewable vitamin or change to adult complete multivitamin one month after surgery. Menstruating women can take a prenatal vitamin.     Make sure has at least 18 mg iron and 187-275 mcg folic acid   Vitamin X54, B Complex Vitamin, and Biotin  Start taking within a month after surgery. Vitamin B12:  1000 mcg of Vitamin B12 three times weekly    Must take sublingually (meaning you take it under your tongue) or in a liquid drop form for easy absorption. B Complex Vitamin: Take a pill or liquid drop form once daily. Biotin: This vitamin can help prevent hair loss.     Recommend 5mg   (5000 mcg) a day  Biotin is Optional

## 2021-11-08 NOTE — PROGRESS NOTES
Bariatric Surgery Consultation    Subjective: Mary Lechuga is a 55 y.o. obese female with a Body mass index is 56.87 kg/m². Will Melvina desires surgery at this time because of health issues and quality of life issues. Mary Lechuga has been seen by a bariatric nutritionist and has been placed on an appropriate low carbohydrate diet. The patient desires laparoscopic sleeve gastrectomy for surgical weight loss, however she is not currently a surgical candidate due to pending work up. Mary Lechuga is here today to check progress with weight loss / evaluate nutritional status and review all subspecialty clearances in hopes of proceeding to the operating room. Office visit notes from February 2020 to present have been reviewed. Mary Lechuga has increased fluid intake, is focusing on protein, is eating regularly, is taking a multivitamin & has increased her activity. Was scheduled for sleeve gastrectomy with Dr Oscar Kraus 8/3/21, but had to cancel due to family emergency. She returns today to look at rescheduling. No recent visits with PCP. No new medications. Was placed on diuretic for dyspnea by pulmonologist, which did not help. States she has had normal pulmonary function tests and sleep studies (will request results). Did review low potassium of 3.3 from mid October, but she is no longer taking fluid pill. She is now seeing Dr Nathan Torres Nov 16th for further workup. Had cardiac catheterization with normal coronary arteries 6/30/2020. Did resume some smoking Sept and \"requit\" October 11th.     Patient Active Problem List    Diagnosis Date Noted    Former smoker     Morbid obesity ([address]     Morbid obesity with body mass index (BMI) of 50.0 to 59.9 in adult ([address]     Hypertension     Arthritic-like pain     Irregular menses     Functional dyspepsia       Past Surgical History:   Procedure Laterality Date    HX HERNIA REPAIR      umbilical     HX OTHER SURGICAL      repair of vaginal trauma related to childbirth      Social History     Tobacco Use    Smoking status: Former Smoker     Quit date: 2021     Years since quittin.6    Smokeless tobacco: Never Used   Substance Use Topics    Alcohol use: Not Currently      Family History   Problem Relation Age of Onset    Heart Disease Mother     Diabetes Mother     Liver Disease Father       Current Outpatient Medications   Medication Sig Dispense Refill    amLODIPine (NORVASC) 10 mg tablet Take 10 mg by mouth daily.        No Known Allergies       Review of Systems:        General - No history or complaints of unexpected fever, chills, or weight loss  Head/Neck - No history or complaints of headache, diplopia, dysphagia, hearing loss  Cardiac - No history or complaints of chest pain, palpitations, murmur, or shortness of breath  Pulmonary - No history or complaints of shortness of breath, productive cough, hemoptysis  Gastrointestinal - minimal reflux,  abdominal pain, obstipation/constipation, blood per rectum  Genitourinary - No history or complaints of hematuria/dysuria, stress urinary incontinence symptoms, or renal lithiasis  Musculoskeletal - moderate joint pain, no muscular weakness  Hematologic - No history or complaints of bleeding disorders, blood transfusions, sickle cell anemia  Neurologic - No history or complaints of  migraine headaches, seizure activity, syncopal episodes, TIA or stroke  Integumentary - No history or complaints of rashes, abnormal nevi, skin cancer  Gynecological - No history of heavy menses/abnormal menses    Objective:     Visit Vitals  /77 (BP 1 Location: Right arm, BP Patient Position: Sitting, BP Cuff Size: Adult long)   Pulse 76   Temp 97.6 °F (36.4 °C)   Ht 5' 4\" (1.626 m)   Wt 150.3 kg (331 lb 5 oz)   SpO2 99%   BMI 56.87 kg/m²       Physical Exam:      General appearance:  alert, cooperative, no distress, appears stated age   Mental status   alert, oriented to person, place, and time   Neck  supple, no significant adenopathy     Lymphatics  no palpable lymphadenopathy, no hepatosplenomegaly   Chest  clear to auscultation, no wheezes, rales or rhonchi, symmetric air entry   Heart  normal rate, regular rhythm, normal S1, S2, no murmurs, rubs, clicks or gallops    Abdomen: soft, nontender, nondistended, no masses or organomegaly   Neurological  alert, oriented, normal speech, no focal findings or movement disorder noted   Musculoskeletal no joint tenderness, deformity or swelling   Extremities peripheral pulses normal, no pedal edema, no clubbing or cyanosis   Skin normal coloration and turgor, no rashes, no suspicious skin lesions noted       Labs:     Recent Results (from the past 2016 hour(s))   SARS-COV-2 BY JULIO    Collection Time: 09/07/21 11:00 AM   Result Value Ref Range    SARS-CoV-2, JULIO Not detected NOTD       CT Thorax 3/4/21  Impression    1. Depending ground-glass opacity likely representing atelectasis.  No evidence of fibrotic change. 2. Stable perifissural nodule. TTE 3/20/20  Narrative    · Normal LV systolic and diastolic function, EF 55 - 60% normal wall   motion   · Mild MR, trace TR   · No vegetations seen no thrombi seen   · No significant pericardial effusion    Cardiac Cath 6/30/20  Left Ventriculogram:   No segmental wall motion abnormality ejection fraction 60%     Coronaries:   1. Left Main Coronary:  Normal   2. Left Anterior Descending:  Normal tortuous   3. Left Circumflex:  Large dominant and normal   4. Right Coronary Artery:  Non dominant and normal   5. Coronary Intervention:  No intervention was required.  The   radial sheath was removed and a Terumo band was applied       Assessment:     Morbid obesity with associated comorbidity of hypertension, severe central obesity & outstanding insurance requirements. Plan: To continue current medications & routine follow-up with PCP. Continuation of Pre-Operative evaluation / clearance:   Johanne Berumen has returned to the office today to discuss her status as a surgical candidate. Progress has been noted and reviewed - including review of notes from dietician. Jennifer Blake is being compliant with follow-up & recommendations. Jennifer Blake has 0 more pounds to lose before proceeding to the OR. (2 pounds lost since last visit)  Jennifer Blake has 0 more nutritional visits to complete before proceeding to the OR. Jennifer Blake understand the rationales for all the above and plans to follow the diet & activity recommendations of the dietician. It has been discussed that given her morbidly obese condition that the best surgical option for this patient would be the laparoscopic sleeve gastrectomy. Jennifer Blake agrees with the surgical choice and has been educated in it's; risks, benefits, and alternatives. We will continue with the pre-operative evaluation as needed to check progress. Ms Balaji Davidson returns today to see about rescheduling her sleeve gastrectomy that was electively postponed earlier this fall. I am concerned about her dyspnea and will await future cardiac evaluation and clearance. She has also been smoking again and is aware of the need to be 100% off cigarettes prior to surgery,    Secondary Diagnoses:     Dietary Intervention  - The patient is currently followed by a bariatric nutritionist for an attempt at preoperative weight loss as has been dictated by their insurance carrier. They will be assessed at various times during their follow up to evaluate their progress depending on the length of time that is required once again by their carrier. I have explained the importance of preoperative weight loss and the benefits regarding lower surgical risk and also assisting the patient in reaching their weight loss goal.  Finally they understand there is a physiologic benefit from the standpoint of hepatic volume reduction preoperatively.   I have reiterated the importance of a low carbohydrate and high protein regimen to achieve their stated goal.    Hypertension - The patient has a clear understanding of how weight loss improves hypertension as a whole, but also they understand that there is a significant genetic component to this disease process. We will monitor the patients blood pressure while in the hospital and the plan would be to continue those medications postoperatively.  If a diuretic is being used we will stop them on discharge to prevent dehydration particularly with the sleeve gastrectomy and the gastric bypass procedures.  They will be instructed to monitor their blood pressure postoperatively while at home and notify their primary care physician in the event of any significantly high or uncharacteristic readings. Smoking Cessation - Today I have counseled the patient extensively regarding smoking cessation. They have been counseled extensively about the detrimental effects of smoking on their weight loss surgical procedure particularly for the gastric bypass and sleeve gastrectomy procedures. They understand that smoking leads to pulmonary issues postoperatively and can lead to gastric ulcers and marginal ulcers in the post bariatric surgery pouch that has been created. They understand that they must stop smoking prior to surgery or it may affect their ultimate progression to their procedure. Aware she must be totally off cigarettes 2 months prior to surgery and will be retested for nicotine prior to surgery. Weight Related Arthritis -The patient understands the benefits that weight loss surgery can have on their arthritis but also understands that weight loss is not a guaranteed cure and relief of symptoms is often dependent on the severity of the underlying disease. The patient also understands that traditional pharmaceutical treatments for this diagnosis are usually unavailable to post-operative weight loss patients due to the effects on the gastrointestinal tract.   Any changes to the patients medication treatment will ultimately be made the patients PCP with input by our office. Ms. Balaji Davidson has a reminder for a \"due or due soon\" health maintenance. I have asked that she contact her primary care provider for follow-up on this health maintenance.       Signed By: Coby Sahni NP     November 8, 2021

## 2022-03-04 DIAGNOSIS — E66.01 MORBID OBESITY WITH BODY MASS INDEX (BMI) OF 50.0 TO 59.9 IN ADULT (HCC): ICD-10-CM

## 2022-03-04 DIAGNOSIS — I10 PRIMARY HYPERTENSION: ICD-10-CM

## 2022-03-04 DIAGNOSIS — E66.01 MORBID OBESITY (HCC): Primary | ICD-10-CM

## 2022-03-04 DIAGNOSIS — Z01.812 BLOOD TESTS PRIOR TO TREATMENT OR PROCEDURE: ICD-10-CM

## 2022-03-23 ENCOUNTER — TELEPHONE (OUTPATIENT)
Dept: SURGERY | Age: 57
End: 2022-03-23

## 2022-03-23 NOTE — TELEPHONE ENCOUNTER
30 days prior to surgery several things need to happen:     Diabetic:  Have your Blood Sugar levels been well controlled and significantly under 200 the entire month prior to surgery? If so, you need to let us know and see your family physician. Pt states she is not diabetic        Hypertension:  Have your blood pressure been well-controlled with a systolic pressure lower than 086 and a diastolic pressure lower than 100? Pt is aware and said her  Blood pressure  has been controlled with meds. She will let us know if she has any abnormal readings         Steroids/ Oral steroids, injections of any kind:  Have you taking any steroids of any kind either in your back, knee, foot or any part of your body? pt aware       Medication:  Have you had any significant changes to any medication?     Pt aware

## 2022-04-06 ENCOUNTER — HOSPITAL ENCOUNTER (OUTPATIENT)
Dept: PREADMISSION TESTING | Age: 57
Discharge: HOME | End: 2022-04-06

## 2022-04-06 RX ORDER — FAMOTIDINE 20 MG/50ML
20 INJECTION, SOLUTION INTRAVENOUS ONCE
Status: CANCELLED | OUTPATIENT
Start: 2022-04-06 | End: 2022-04-06

## 2022-04-06 RX ORDER — NYSTATIN 100000 [USP'U]/ML
500000 SUSPENSION ORAL
Status: CANCELLED | OUTPATIENT
Start: 2022-04-06 | End: 2022-04-06

## 2022-04-06 RX ORDER — SODIUM CHLORIDE, SODIUM LACTATE, POTASSIUM CHLORIDE, CALCIUM CHLORIDE 600; 310; 30; 20 MG/100ML; MG/100ML; MG/100ML; MG/100ML
125 INJECTION, SOLUTION INTRAVENOUS CONTINUOUS
Status: CANCELLED | OUTPATIENT
Start: 2022-04-06

## 2022-04-06 RX ORDER — ACETAMINOPHEN 500 MG
1000 TABLET ORAL ONCE
Status: CANCELLED | OUTPATIENT
Start: 2022-04-06 | End: 2022-04-06

## 2022-04-06 NOTE — PERIOP NOTES
Scheduled for surgery 4-. Called pt. Unable to do surgery at this time. Her mother is in hospital. Helen Ernandez at [employer] office and informed her of this on behalf of the pt.

## 2022-05-13 ENCOUNTER — TELEPHONE (OUTPATIENT)
Dept: SURGERY | Age: 57
End: 2022-05-13

## 2022-05-13 NOTE — TELEPHONE ENCOUNTER
A telephone call was placed to patient to assess habits 30 days prior to having surgery.        LVM/am           5/16/2022 phone went straight to [facility]

## (undated) DEVICE — MAGNETIC INSTRUMENT PAD 10" X 16"; MEDIUM; DISPOSABLE: Brand: CARDINAL HEALTH

## (undated) DEVICE — SYR 10ML CTRL LR LCK NSAF LF --

## (undated) DEVICE — GOWN ISOLATN REG BLU POLY UNISX W/ THMB LOOP

## (undated) DEVICE — MEDI-VAC SUCTION HANDLE REGULAR CAPACITY: Brand: CARDINAL HEALTH

## (undated) DEVICE — CANNULA PERF L2IN BLNT TIP 2MM VES CLR RADPQ BODY FEM LUER

## (undated) DEVICE — STERILE POLYISOPRENE POWDER-FREE SURGICAL GLOVES: Brand: PROTEXIS

## (undated) DEVICE — GARMENT,MEDLINE,DVT,INT,CALF,MED, GEN2: Brand: MEDLINE

## (undated) DEVICE — SYR 20ML LL STRL LF --

## (undated) DEVICE — MAJ-1414 SINGLE USE ADPATER BIOPSY VALV: Brand: SINGLE USE ADAPTOR BIOPSY VALVE

## (undated) DEVICE — SPONGE GZ W4XL4IN COT 12 PLY TYP VII WVN C FLD DSGN

## (undated) DEVICE — KENDALL SCD EXPRESS SLEEVES, KNEE LENGTH, MEDIUM: Brand: KENDALL SCD

## (undated) DEVICE — SURGIFOAM SPNG SZ 12-7

## (undated) DEVICE — STRAP,POSITIONING,KNEE/BODY,FOAM,4X60": Brand: MEDLINE

## (undated) DEVICE — INTENDED FOR TISSUE SEPARATION, AND OTHER PROCEDURES THAT REQUIRE A SHARP SURGICAL BLADE TO PUNCTURE OR CUT.: Brand: BARD-PARKER ® CARBON RIB-BACK BLADES

## (undated) DEVICE — 10FR FRAZIER SUCTION HANDLE: Brand: CARDINAL HEALTH

## (undated) DEVICE — SHEET, DRAPE, SPLIT, STERILE: Brand: MEDLINE

## (undated) DEVICE — STERILE LATEX POWDER-FREE SURGICAL GLOVESWITH NITRILE COATING: Brand: PROTEXIS

## (undated) DEVICE — NEEDLE HYPO 25GA L1.5IN BLU POLYPR HUB S STL REG BVL STR

## (undated) DEVICE — SOLUTION IV 1000ML 0.9% SOD CHL

## (undated) DEVICE — PACKING GZ W2INXL6FT WVN COT VAG RADPQ

## (undated) DEVICE — SUTURE VCRL SZ 4-0 L27IN ABSRB UD L19MM PS-2 3/8 CIR PRIM J426H

## (undated) DEVICE — MAJOR: Brand: MEDLINE INDUSTRIES, INC.